# Patient Record
Sex: FEMALE | Race: WHITE
[De-identification: names, ages, dates, MRNs, and addresses within clinical notes are randomized per-mention and may not be internally consistent; named-entity substitution may affect disease eponyms.]

---

## 2017-03-07 ENCOUNTER — RX ONLY (RX ONLY)
Age: 52
End: 2017-03-07

## 2017-03-07 ENCOUNTER — DOCTOR'S OFFICE (OUTPATIENT)
Dept: URBAN - NONMETROPOLITAN AREA CLINIC 1 | Facility: CLINIC | Age: 52
Setting detail: OPHTHALMOLOGY
End: 2017-03-07
Payer: COMMERCIAL

## 2017-03-07 DIAGNOSIS — H44.30: ICD-10-CM

## 2017-03-07 DIAGNOSIS — H04.122: ICD-10-CM

## 2017-03-07 DIAGNOSIS — H52.13: ICD-10-CM

## 2017-03-07 DIAGNOSIS — H04.121: ICD-10-CM

## 2017-03-07 DIAGNOSIS — H43.813: ICD-10-CM

## 2017-03-07 PROCEDURE — 92134 CPTRZ OPH DX IMG PST SGM RTA: CPT | Performed by: OPHTHALMOLOGY

## 2017-03-07 PROCEDURE — 92014 COMPRE OPH EXAM EST PT 1/>: CPT | Performed by: OPHTHALMOLOGY

## 2017-03-07 PROCEDURE — 83861 MICROFLUID ANALY TEARS: CPT | Performed by: OPHTHALMOLOGY

## 2017-03-07 ASSESSMENT — REFRACTION_MANIFEST
OD_VA1: 20/
OS_VA1: 20/
OD_VA1: 20/
OD_VA1: 20/
OU_VA: 20/
OD_VA2: 20/
OD_VA3: 20/
OS_VA2: 20/
OD_VA3: 20/
OS_VA2: 20/
OS_VA3: 20/
OD_VA3: 20/
OD_VA2: 20/
OS_VA1: 20/
OS_VA3: 20/
OS_VA1: 20/
OU_VA: 20/
OD_VA2: 20/
OS_VA3: 20/
OU_VA: 20/
OS_VA2: 20/

## 2017-03-07 ASSESSMENT — REFRACTION_AUTOREFRACTION
OD_AXIS: 41
OD_SPHERE: -3.25
OD_AXIS: 011
OS_AXIS: 28
OS_SPHERE: -2.75
OD_CYLINDER: -0.75
OD_SPHERE: +0.75
OS_SPHERE: +0.25
OS_CYLINDER: -0.50
OS_CYLINDER: -0.75
OD_CYLINDER: -1.25
OS_AXIS: 017

## 2017-03-07 ASSESSMENT — SPHEQUIV_DERIVED
OS_SPHEQUIV: -0.125
OD_SPHEQUIV: 0.125
OD_SPHEQUIV: -3.625
OS_SPHEQUIV: -3

## 2017-03-07 ASSESSMENT — REFRACTION_CURRENTRX
OD_OVR_VA: 20/
OS_OVR_VA: 20/

## 2017-03-07 ASSESSMENT — CONFRONTATIONAL VISUAL FIELD TEST (CVF)
OS_FINDINGS: FULL
OD_FINDINGS: FULL

## 2017-03-07 ASSESSMENT — VISUAL ACUITY
OS_BCVA: 20/25+2
OD_BCVA: 20/30+2

## 2017-03-17 ENCOUNTER — DOCTOR'S OFFICE (OUTPATIENT)
Dept: URBAN - NONMETROPOLITAN AREA CLINIC 1 | Facility: CLINIC | Age: 52
Setting detail: OPHTHALMOLOGY
End: 2017-03-17
Payer: COMMERCIAL

## 2017-03-17 DIAGNOSIS — H04.122: ICD-10-CM

## 2017-03-17 DIAGNOSIS — H43.813: ICD-10-CM

## 2017-03-17 DIAGNOSIS — H44.22: ICD-10-CM

## 2017-03-17 DIAGNOSIS — H04.121: ICD-10-CM

## 2017-03-17 DIAGNOSIS — H44.21: ICD-10-CM

## 2017-03-17 PROCEDURE — 92014 COMPRE OPH EXAM EST PT 1/>: CPT | Performed by: OPHTHALMOLOGY

## 2017-03-17 PROCEDURE — 92226 OPHTHALMOSCOPY EXT SUBSEQUENT: CPT | Performed by: OPHTHALMOLOGY

## 2017-03-17 ASSESSMENT — REFRACTION_MANIFEST
OU_VA: 20/
OS_VA3: 20/
OS_VA2: 20/
OS_VA1: 20/
OD_VA3: 20/
OD_VA2: 20/
OS_VA1: 20/
OS_VA3: 20/
OS_VA2: 20/
OD_VA3: 20/
OS_VA2: 20/
OD_VA1: 20/
OU_VA: 20/
OS_VA1: 20/
OD_VA2: 20/
OD_VA1: 20/
OD_VA1: 20/
OU_VA: 20/
OD_VA2: 20/
OD_VA3: 20/
OS_VA3: 20/

## 2017-03-17 ASSESSMENT — REFRACTION_AUTOREFRACTION
OD_AXIS: 41
OS_CYLINDER: -0.75
OS_AXIS: 017
OS_SPHERE: +0.25
OD_AXIS: 011
OS_AXIS: 28
OD_CYLINDER: -0.75
OD_SPHERE: +0.75
OS_CYLINDER: -0.50
OD_SPHERE: -3.25
OS_SPHERE: -2.75
OD_CYLINDER: -1.25

## 2017-03-17 ASSESSMENT — REFRACTION_CURRENTRX
OS_OVR_VA: 20/
OD_OVR_VA: 20/
OS_OVR_VA: 20/
OS_OVR_VA: 20/

## 2017-03-17 ASSESSMENT — CONFRONTATIONAL VISUAL FIELD TEST (CVF)
OD_FINDINGS: FULL
OS_FINDINGS: FULL

## 2017-03-17 ASSESSMENT — SPHEQUIV_DERIVED
OS_SPHEQUIV: -3
OD_SPHEQUIV: 0.125
OS_SPHEQUIV: -0.125
OD_SPHEQUIV: -3.625

## 2017-03-17 ASSESSMENT — VISUAL ACUITY
OD_BCVA: 20/25
OS_BCVA: 20/25

## 2017-09-18 ENCOUNTER — DOCTOR'S OFFICE (OUTPATIENT)
Dept: URBAN - NONMETROPOLITAN AREA CLINIC 1 | Facility: CLINIC | Age: 52
Setting detail: OPHTHALMOLOGY
End: 2017-09-18
Payer: COMMERCIAL

## 2017-09-18 DIAGNOSIS — H04.123: ICD-10-CM

## 2017-09-18 DIAGNOSIS — H44.21: ICD-10-CM

## 2017-09-18 DIAGNOSIS — H43.813: ICD-10-CM

## 2017-09-18 DIAGNOSIS — H44.22: ICD-10-CM

## 2017-09-18 DIAGNOSIS — H44.23: ICD-10-CM

## 2017-09-18 PROCEDURE — 92014 COMPRE OPH EXAM EST PT 1/>: CPT | Performed by: OPHTHALMOLOGY

## 2017-09-18 PROCEDURE — 92226 OPHTHALMOSCOPY EXT SUBSEQUENT: CPT | Performed by: OPHTHALMOLOGY

## 2017-09-18 ASSESSMENT — REFRACTION_MANIFEST
OD_VA3: 20/
OS_VA3: 20/
OS_VA2: 20/
OD_VA2: 20/
OS_VA1: 20/
OS_VA3: 20/
OU_VA: 20/
OD_VA1: 20/
OU_VA: 20/
OD_VA1: 20/
OS_VA2: 20/
OD_VA2: 20/
OU_VA: 20/
OD_VA1: 20/
OS_VA3: 20/
OD_VA3: 20/
OS_VA2: 20/
OS_VA1: 20/
OD_VA3: 20/
OS_VA1: 20/
OD_VA2: 20/

## 2017-09-18 ASSESSMENT — VISUAL ACUITY
OD_BCVA: 20/25
OS_BCVA: 20/25

## 2017-09-18 ASSESSMENT — SPHEQUIV_DERIVED
OS_SPHEQUIV: -3
OD_SPHEQUIV: -3.625
OS_SPHEQUIV: -0.125
OD_SPHEQUIV: 0.125

## 2017-09-18 ASSESSMENT — REFRACTION_CURRENTRX
OS_OVR_VA: 20/
OS_OVR_VA: 20/
OD_OVR_VA: 20/
OD_OVR_VA: 20/
OS_OVR_VA: 20/
OD_OVR_VA: 20/

## 2017-09-18 ASSESSMENT — CONFRONTATIONAL VISUAL FIELD TEST (CVF)
OD_FINDINGS: FULL
OS_FINDINGS: FULL

## 2017-09-18 ASSESSMENT — REFRACTION_AUTOREFRACTION
OS_AXIS: 017
OS_CYLINDER: -0.75
OD_CYLINDER: -0.75
OS_SPHERE: +0.25
OD_SPHERE: -3.25
OD_AXIS: 41
OS_AXIS: 28
OD_SPHERE: +0.75
OD_AXIS: 011
OS_SPHERE: -2.75
OD_CYLINDER: -1.25
OS_CYLINDER: -0.50

## 2018-03-29 ENCOUNTER — DOCTOR'S OFFICE (OUTPATIENT)
Dept: URBAN - NONMETROPOLITAN AREA CLINIC 1 | Facility: CLINIC | Age: 53
Setting detail: OPHTHALMOLOGY
End: 2018-03-29
Payer: COMMERCIAL

## 2018-03-29 DIAGNOSIS — H44.23: ICD-10-CM

## 2018-03-29 DIAGNOSIS — H43.811: ICD-10-CM

## 2018-03-29 DIAGNOSIS — H43.812: ICD-10-CM

## 2018-03-29 DIAGNOSIS — H04.123: ICD-10-CM

## 2018-03-29 DIAGNOSIS — H43.813: ICD-10-CM

## 2018-03-29 DIAGNOSIS — H04.122: ICD-10-CM

## 2018-03-29 PROCEDURE — 92134 CPTRZ OPH DX IMG PST SGM RTA: CPT | Performed by: OPHTHALMOLOGY

## 2018-03-29 PROCEDURE — 92226 OPHTHALMOSCOPY EXT SUBSEQUENT: CPT | Performed by: OPHTHALMOLOGY

## 2018-03-29 PROCEDURE — 83861 MICROFLUID ANALY TEARS: CPT | Performed by: OPHTHALMOLOGY

## 2018-03-29 PROCEDURE — 92014 COMPRE OPH EXAM EST PT 1/>: CPT | Performed by: OPHTHALMOLOGY

## 2018-03-29 ASSESSMENT — REFRACTION_MANIFEST
OS_VA1: 20/
OD_VA3: 20/
OU_VA: 20/
OS_VA2: 20/
OS_VA3: 20/
OS_VA2: 20/
OD_VA2: 20/
OU_VA: 20/
OD_VA3: 20/
OD_VA2: 20/
OD_VA3: 20/
OD_VA1: 20/
OS_VA2: 20/
OS_VA3: 20/
OS_VA1: 20/
OD_VA1: 20/
OU_VA: 20/
OD_VA2: 20/
OD_VA1: 20/
OS_VA1: 20/
OS_VA3: 20/

## 2018-03-29 ASSESSMENT — REFRACTION_AUTOREFRACTION
OS_AXIS: 017
OS_CYLINDER: -0.50
OD_AXIS: 41
OS_SPHERE: +0.25
OD_SPHERE: -3.25
OS_AXIS: 28
OD_CYLINDER: -0.75
OS_CYLINDER: -0.75
OD_SPHERE: +0.75
OD_AXIS: 011
OS_SPHERE: -2.75
OD_CYLINDER: -1.25

## 2018-03-29 ASSESSMENT — CONFRONTATIONAL VISUAL FIELD TEST (CVF)
OS_FINDINGS: FULL
OD_FINDINGS: FULL

## 2018-03-29 ASSESSMENT — REFRACTION_CURRENTRX
OD_OVR_VA: 20/
OS_OVR_VA: 20/
OD_OVR_VA: 20/
OS_OVR_VA: 20/
OD_OVR_VA: 20/
OS_OVR_VA: 20/

## 2018-03-29 ASSESSMENT — SPHEQUIV_DERIVED
OD_SPHEQUIV: -3.625
OD_SPHEQUIV: 0.125
OS_SPHEQUIV: -3
OS_SPHEQUIV: -0.125

## 2018-03-29 ASSESSMENT — VISUAL ACUITY
OS_BCVA: 20/25-2
OD_BCVA: 20/25

## 2018-04-27 ENCOUNTER — DOCTOR'S OFFICE (OUTPATIENT)
Dept: URBAN - NONMETROPOLITAN AREA CLINIC 1 | Facility: CLINIC | Age: 53
Setting detail: OPHTHALMOLOGY
End: 2018-04-27
Payer: COMMERCIAL

## 2018-04-27 DIAGNOSIS — H44.23: ICD-10-CM

## 2018-04-27 DIAGNOSIS — H04.123: ICD-10-CM

## 2018-04-27 DIAGNOSIS — H10.13: ICD-10-CM

## 2018-04-27 PROCEDURE — 92012 INTRM OPH EXAM EST PATIENT: CPT | Performed by: OPHTHALMOLOGY

## 2018-04-27 ASSESSMENT — KERATOMETRY
OD_AXISANGLE_DEGREES: 94
OS_AXISANGLE_DEGREES: 99
OS_K2POWER_DIOPTERS: 45.37
OD_K1POWER_DIOPTERS: 44.0
OD_K2POWER_DIOPTERS: 45.62
OS_K1POWER_DIOPTERS: 44.62

## 2018-04-27 ASSESSMENT — REFRACTION_MANIFEST
OD_VA3: 20/
OS_VA1: 20/
OD_VA3: 20/
OS_VA3: 20/
OU_VA: 20/
OS_VA3: 20/
OS_VA2: 20/
OS_VA2: 20/
OU_VA: 20/
OD_VA2: 20/
OU_VA: 20/
OS_VA2: 20/
OS_VA1: 20/
OD_VA2: 20/
OD_VA3: 20/
OD_VA1: 20/
OD_VA2: 20/
OS_VA1: 20/
OS_VA3: 20/
OD_VA1: 20/
OD_VA1: 20/

## 2018-04-27 ASSESSMENT — REFRACTION_AUTOREFRACTION
OS_CYLINDER: -0.50
OD_SPHERE: +1.00
OD_CYLINDER: -0.75
OD_CYLINDER: -1.00
OS_AXIS: 017
OD_SPHERE: -3.25
OS_SPHERE: -2.75
OS_CYLINDER: -0.50
OD_AXIS: 011
OD_AXIS: 023
OS_SPHERE: PL
OS_AXIS: 178

## 2018-04-27 ASSESSMENT — VISUAL ACUITY
OS_BCVA: 20/25-1
OD_BCVA: 20/25

## 2018-04-27 ASSESSMENT — REFRACTION_CURRENTRX
OD_OVR_VA: 20/
OS_OVR_VA: 20/

## 2018-04-27 ASSESSMENT — SPHEQUIV_DERIVED
OD_SPHEQUIV: 0.5
OS_SPHEQUIV: -3
OD_SPHEQUIV: -3.625

## 2018-04-27 ASSESSMENT — AXIALLENGTH_DERIVED
OD_AL: 24.5613
OS_AL: 24.2283
OD_AL: 22.9348

## 2018-05-30 ENCOUNTER — RX ONLY (RX ONLY)
Age: 53
End: 2018-05-30

## 2018-05-30 ENCOUNTER — DOCTOR'S OFFICE (OUTPATIENT)
Dept: URBAN - NONMETROPOLITAN AREA CLINIC 1 | Facility: CLINIC | Age: 53
Setting detail: OPHTHALMOLOGY
End: 2018-05-30
Payer: COMMERCIAL

## 2018-05-30 DIAGNOSIS — H04.121: ICD-10-CM

## 2018-05-30 DIAGNOSIS — H10.13: ICD-10-CM

## 2018-05-30 DIAGNOSIS — H04.122: ICD-10-CM

## 2018-05-30 PROCEDURE — 99212 OFFICE O/P EST SF 10 MIN: CPT | Performed by: OPHTHALMOLOGY

## 2018-05-30 ASSESSMENT — LID EXAM ASSESSMENTS
OD_BLEPHARITIS: ABSENT
OS_BLEPHARITIS: ABSENT

## 2018-05-30 ASSESSMENT — SUPERFICIAL PUNCTATE KERATITIS (SPK)
OD_SPK: T
OS_SPK: T

## 2018-05-30 ASSESSMENT — DRY EYES - PHYSICIAN NOTES: OD_GENERALCOMMENTS: INFERIORLY

## 2018-05-31 ASSESSMENT — VISUAL ACUITY
OD_BCVA: 20/30+1
OS_BCVA: 20/30+1

## 2018-05-31 ASSESSMENT — REFRACTION_MANIFEST
OU_VA: 20/
OS_VA3: 20/
OD_VA3: 20/
OD_VA1: 20/
OS_VA1: 20/
OU_VA: 20/
OS_VA1: 20/
OS_VA2: 20/
OS_VA2: 20/
OD_VA1: 20/
OD_VA2: 20/
OS_VA2: 20/
OD_VA2: 20/
OS_VA3: 20/
OD_VA3: 20/
OS_VA3: 20/
OD_VA1: 20/
OS_VA1: 20/
OD_VA2: 20/
OD_VA3: 20/
OU_VA: 20/

## 2018-05-31 ASSESSMENT — REFRACTION_AUTOREFRACTION
OS_CYLINDER: -0.50
OD_CYLINDER: -1.00
OD_AXIS: 023
OS_SPHERE: PL
OS_AXIS: 017
OS_AXIS: 178
OS_SPHERE: -2.75
OS_CYLINDER: -0.50
OD_SPHERE: -3.25
OD_SPHERE: +1.00
OD_AXIS: 011
OD_CYLINDER: -0.75

## 2018-05-31 ASSESSMENT — REFRACTION_CURRENTRX
OS_OVR_VA: 20/
OD_OVR_VA: 20/
OS_OVR_VA: 20/
OD_OVR_VA: 20/
OS_OVR_VA: 20/
OD_OVR_VA: 20/

## 2018-05-31 ASSESSMENT — SPHEQUIV_DERIVED
OD_SPHEQUIV: -3.625
OD_SPHEQUIV: 0.5
OS_SPHEQUIV: -3

## 2018-08-29 ENCOUNTER — DOCTOR'S OFFICE (OUTPATIENT)
Dept: URBAN - NONMETROPOLITAN AREA CLINIC 1 | Facility: CLINIC | Age: 53
Setting detail: OPHTHALMOLOGY
End: 2018-08-29
Payer: COMMERCIAL

## 2018-08-29 DIAGNOSIS — H04.123: ICD-10-CM

## 2018-08-29 DIAGNOSIS — H04.121: ICD-10-CM

## 2018-08-29 DIAGNOSIS — H04.122: ICD-10-CM

## 2018-08-29 DIAGNOSIS — H10.13: ICD-10-CM

## 2018-08-29 PROCEDURE — 83861 MICROFLUID ANALY TEARS: CPT | Performed by: OPHTHALMOLOGY

## 2018-08-29 PROCEDURE — 68761 CLOSE TEAR DUCT OPENING: CPT | Performed by: OPHTHALMOLOGY

## 2018-08-29 PROCEDURE — 99213 OFFICE O/P EST LOW 20 MIN: CPT | Performed by: OPHTHALMOLOGY

## 2018-08-29 ASSESSMENT — PUNCTA - ASSESSMENT
OS_PUNCTA: COL PLUG LLL LUL MED
OD_PUNCTA: COL PLUG RLL RUL MED

## 2018-08-29 ASSESSMENT — LID EXAM ASSESSMENTS
OD_BLEPHARITIS: ABSENT
OS_BLEPHARITIS: ABSENT

## 2018-08-29 ASSESSMENT — SUPERFICIAL PUNCTATE KERATITIS (SPK)
OS_SPK: T
OD_SPK: T 1+

## 2018-08-29 ASSESSMENT — DRY EYES - PHYSICIAN NOTES
OS_GENERALCOMMENTS: DIFFUSE
OD_GENERALCOMMENTS: DIFFUSE

## 2018-08-30 ASSESSMENT — REFRACTION_CURRENTRX
OS_OVR_VA: 20/
OD_OVR_VA: 20/
OS_OVR_VA: 20/
OD_OVR_VA: 20/
OS_OVR_VA: 20/
OD_OVR_VA: 20/

## 2018-08-30 ASSESSMENT — REFRACTION_MANIFEST
OD_VA2: 20/
OU_VA: 20/
OD_VA3: 20/
OD_VA3: 20/
OU_VA: 20/
OS_VA2: 20/
OS_VA1: 20/
OS_VA2: 20/
OS_VA3: 20/
OD_VA1: 20/
OD_VA2: 20/
OS_VA1: 20/
OD_VA1: 20/
OS_VA3: 20/

## 2018-08-30 ASSESSMENT — REFRACTION_AUTOREFRACTION
OD_CYLINDER: -1.00
OS_CYLINDER: -0.50
OD_SPHERE: -3.25
OS_CYLINDER: -0.50
OD_SPHERE: +1.00
OS_SPHERE: PL
OS_AXIS: 017
OD_AXIS: 023
OD_CYLINDER: -0.75
OS_SPHERE: -2.75
OS_AXIS: 178
OD_AXIS: 011

## 2018-08-30 ASSESSMENT — SPHEQUIV_DERIVED
OD_SPHEQUIV: -3.625
OD_SPHEQUIV: 0.5
OS_SPHEQUIV: -3

## 2018-08-30 ASSESSMENT — VISUAL ACUITY
OD_BCVA: 20/30
OS_BCVA: 20/30

## 2018-09-27 ENCOUNTER — DOCTOR'S OFFICE (OUTPATIENT)
Dept: URBAN - NONMETROPOLITAN AREA CLINIC 1 | Facility: CLINIC | Age: 53
Setting detail: OPHTHALMOLOGY
End: 2018-09-27
Payer: COMMERCIAL

## 2018-09-27 DIAGNOSIS — H44.23: ICD-10-CM

## 2018-09-27 DIAGNOSIS — H04.123: ICD-10-CM

## 2018-09-27 DIAGNOSIS — H43.812: ICD-10-CM

## 2018-09-27 DIAGNOSIS — H10.13: ICD-10-CM

## 2018-09-27 DIAGNOSIS — H43.813: ICD-10-CM

## 2018-09-27 DIAGNOSIS — H43.811: ICD-10-CM

## 2018-09-27 PROCEDURE — 92014 COMPRE OPH EXAM EST PT 1/>: CPT | Performed by: OPHTHALMOLOGY

## 2018-09-27 PROCEDURE — 92226 OPHTHALMOSCOPY EXT SUBSEQUENT: CPT | Performed by: OPHTHALMOLOGY

## 2018-09-27 PROCEDURE — 92134 CPTRZ OPH DX IMG PST SGM RTA: CPT | Performed by: OPHTHALMOLOGY

## 2018-09-27 ASSESSMENT — REFRACTION_CURRENTRX
OD_OVR_VA: 20/
OS_OVR_VA: 20/
OD_OVR_VA: 20/
OD_OVR_VA: 20/
OS_OVR_VA: 20/
OS_OVR_VA: 20/

## 2018-09-27 ASSESSMENT — REFRACTION_MANIFEST
OD_VA2: 20/
OD_VA1: 20/
OS_VA1: 20/
OD_VA3: 20/
OS_VA3: 20/
OD_VA2: 20/
OU_VA: 20/
OD_VA1: 20/
OS_VA2: 20/
OS_VA3: 20/
OD_VA3: 20/
OS_VA1: 20/
OS_VA2: 20/
OU_VA: 20/

## 2018-09-27 ASSESSMENT — PUNCTA - ASSESSMENT
OS_PUNCTA: COL PLUG LLL LUL MED
OD_PUNCTA: COL PLUG RLL RUL MED

## 2018-09-27 ASSESSMENT — SPHEQUIV_DERIVED
OS_SPHEQUIV: -3
OD_SPHEQUIV: -3.625
OD_SPHEQUIV: 0.5

## 2018-09-27 ASSESSMENT — REFRACTION_AUTOREFRACTION
OD_CYLINDER: -0.75
OS_CYLINDER: -0.50
OD_SPHERE: +1.00
OD_AXIS: 023
OS_SPHERE: -2.75
OD_SPHERE: -3.25
OD_AXIS: 011
OS_SPHERE: PL
OS_AXIS: 017
OS_CYLINDER: -0.50
OD_CYLINDER: -1.00
OS_AXIS: 178

## 2018-09-27 ASSESSMENT — LID EXAM ASSESSMENTS
OD_BLEPHARITIS: ABSENT
OS_BLEPHARITIS: ABSENT

## 2018-09-27 ASSESSMENT — DRY EYES - PHYSICIAN NOTES
OD_GENERALCOMMENTS: DIFFUSE
OS_GENERALCOMMENTS: DIFFUSE

## 2018-09-27 ASSESSMENT — SUPERFICIAL PUNCTATE KERATITIS (SPK)
OD_SPK: T 1+
OS_SPK: T

## 2018-09-27 ASSESSMENT — VISUAL ACUITY
OS_BCVA: 20/25+1
OD_BCVA: 20/25-1

## 2018-09-27 ASSESSMENT — CONFRONTATIONAL VISUAL FIELD TEST (CVF)
OS_FINDINGS: FULL
OD_FINDINGS: FULL

## 2018-10-08 ENCOUNTER — RX ONLY (RX ONLY)
Age: 53
End: 2018-10-08

## 2018-10-08 ENCOUNTER — DOCTOR'S OFFICE (OUTPATIENT)
Dept: URBAN - NONMETROPOLITAN AREA CLINIC 1 | Facility: CLINIC | Age: 53
Setting detail: OPHTHALMOLOGY
End: 2018-10-08
Payer: COMMERCIAL

## 2018-10-08 DIAGNOSIS — H04.122: ICD-10-CM

## 2018-10-08 DIAGNOSIS — H04.121: ICD-10-CM

## 2018-10-08 PROCEDURE — 99213 OFFICE O/P EST LOW 20 MIN: CPT | Performed by: OPHTHALMOLOGY

## 2018-10-08 ASSESSMENT — LID EXAM ASSESSMENTS
OS_BLEPHARITIS: ABSENT
OD_BLEPHARITIS: ABSENT

## 2018-10-08 ASSESSMENT — DRY EYES - PHYSICIAN NOTES
OS_GENERALCOMMENTS: INFERIOR
OD_GENERALCOMMENTS: INFERIOR

## 2018-10-08 ASSESSMENT — SUPERFICIAL PUNCTATE KERATITIS (SPK)
OD_SPK: T
OS_SPK: T

## 2018-10-09 ASSESSMENT — REFRACTION_AUTOREFRACTION
OS_CYLINDER: -0.50
OD_SPHERE: -3.25
OD_AXIS: 011
OS_CYLINDER: -0.50
OS_AXIS: 017
OD_SPHERE: +1.00
OS_AXIS: 178
OD_AXIS: 023
OS_SPHERE: PL
OD_CYLINDER: -0.75
OD_CYLINDER: -1.00
OS_SPHERE: -2.75

## 2018-10-09 ASSESSMENT — REFRACTION_CURRENTRX
OS_OVR_VA: 20/
OD_OVR_VA: 20/

## 2018-10-09 ASSESSMENT — REFRACTION_MANIFEST
OD_VA2: 20/
OS_VA3: 20/
OD_VA1: 20/
OD_VA3: 20/
OS_VA3: 20/
OD_VA2: 20/
OU_VA: 20/
OD_VA3: 20/
OS_VA1: 20/
OS_VA2: 20/
OD_VA1: 20/
OS_VA1: 20/
OS_VA2: 20/
OU_VA: 20/

## 2018-10-09 ASSESSMENT — VISUAL ACUITY
OD_BCVA: 20/25+2
OS_BCVA: 20/25+2

## 2018-10-09 ASSESSMENT — SPHEQUIV_DERIVED
OD_SPHEQUIV: 0.5
OD_SPHEQUIV: -3.625
OS_SPHEQUIV: -3

## 2018-12-27 ENCOUNTER — DOCTOR'S OFFICE (OUTPATIENT)
Dept: URBAN - NONMETROPOLITAN AREA CLINIC 1 | Facility: CLINIC | Age: 53
Setting detail: OPHTHALMOLOGY
End: 2018-12-27
Payer: COMMERCIAL

## 2018-12-27 DIAGNOSIS — H04.123: ICD-10-CM

## 2018-12-27 PROCEDURE — 99214 OFFICE O/P EST MOD 30 MIN: CPT | Performed by: OPHTHALMOLOGY

## 2018-12-27 ASSESSMENT — LID EXAM ASSESSMENTS
OS_BLEPHARITIS: ABSENT
OD_BLEPHARITIS: ABSENT

## 2018-12-27 ASSESSMENT — SUPERFICIAL PUNCTATE KERATITIS (SPK)
OD_SPK: T
OS_SPK: T

## 2018-12-28 ASSESSMENT — REFRACTION_MANIFEST
OS_VA3: 20/
OU_VA: 20/
OU_VA: 20/
OD_VA2: 20/
OD_VA1: 20/
OS_VA2: 20/
OD_VA3: 20/
OS_VA1: 20/
OS_VA3: 20/
OS_VA2: 20/
OD_VA2: 20/
OD_VA1: 20/
OD_VA3: 20/
OS_VA1: 20/

## 2018-12-28 ASSESSMENT — VISUAL ACUITY
OS_BCVA: 20/25+2
OD_BCVA: 20/25+2

## 2018-12-28 ASSESSMENT — REFRACTION_CURRENTRX
OD_OVR_VA: 20/
OD_OVR_VA: 20/
OS_OVR_VA: 20/
OD_OVR_VA: 20/

## 2018-12-28 ASSESSMENT — REFRACTION_AUTOREFRACTION
OD_SPHERE: +1.00
OS_AXIS: 178
OD_SPHERE: -3.25
OS_AXIS: 017
OD_AXIS: 023
OD_AXIS: 011
OS_CYLINDER: -0.50
OD_CYLINDER: -0.75
OS_SPHERE: -2.75
OD_CYLINDER: -1.00
OS_SPHERE: PL
OS_CYLINDER: -0.50

## 2018-12-28 ASSESSMENT — SPHEQUIV_DERIVED
OD_SPHEQUIV: 0.5
OS_SPHEQUIV: -3
OD_SPHEQUIV: -3.625

## 2019-04-30 ENCOUNTER — DOCTOR'S OFFICE (OUTPATIENT)
Dept: URBAN - NONMETROPOLITAN AREA CLINIC 1 | Facility: CLINIC | Age: 54
Setting detail: OPHTHALMOLOGY
End: 2019-04-30
Payer: COMMERCIAL

## 2019-04-30 DIAGNOSIS — H10.13: ICD-10-CM

## 2019-04-30 DIAGNOSIS — H40.013: ICD-10-CM

## 2019-04-30 DIAGNOSIS — H04.123: ICD-10-CM

## 2019-04-30 DIAGNOSIS — H43.813: ICD-10-CM

## 2019-04-30 PROCEDURE — 92014 COMPRE OPH EXAM EST PT 1/>: CPT | Performed by: OPHTHALMOLOGY

## 2019-04-30 PROCEDURE — 76514 ECHO EXAM OF EYE THICKNESS: CPT | Performed by: OPHTHALMOLOGY

## 2019-04-30 PROCEDURE — 92250 FUNDUS PHOTOGRAPHY W/I&R: CPT | Performed by: OPHTHALMOLOGY

## 2019-04-30 ASSESSMENT — REFRACTION_MANIFEST
OS_VA2: 20/
OD_VA3: 20/
OS_VA1: 20/
OD_VA2: 20/
OS_VA3: 20/
OD_VA3: 20/
OS_VA3: 20/
OU_VA: 20/
OS_VA2: 20/
OD_VA1: 20/
OD_VA2: 20/
OS_VA1: 20/
OU_VA: 20/
OD_VA1: 20/

## 2019-04-30 ASSESSMENT — REFRACTION_AUTOREFRACTION
OD_CYLINDER: -0.50
OS_SPHERE: -2.75
OS_CYLINDER: -0.50
OD_SPHERE: -3.25
OS_CYLINDER: -1.75
OS_AXIS: 017
OS_AXIS: 049
OD_CYLINDER: -0.75
OD_AXIS: 151
OD_AXIS: 011
OD_SPHERE: +0.75
OS_SPHERE: 0.00

## 2019-04-30 ASSESSMENT — LID EXAM ASSESSMENTS
OS_BLEPHARITIS: ABSENT
OD_BLEPHARITIS: ABSENT

## 2019-04-30 ASSESSMENT — SUPERFICIAL PUNCTATE KERATITIS (SPK)
OS_SPK: T
OD_SPK: T

## 2019-04-30 ASSESSMENT — SPHEQUIV_DERIVED
OS_SPHEQUIV: -3
OD_SPHEQUIV: 0.5
OD_SPHEQUIV: -3.625
OS_SPHEQUIV: -0.875

## 2019-04-30 ASSESSMENT — KERATOMETRY
OS_K1POWER_DIOPTERS: 44.62
OD_AXISANGLE_DEGREES: 94
OD_K2POWER_DIOPTERS: 45.62
OS_AXISANGLE_DEGREES: 99
OD_K1POWER_DIOPTERS: 44.0
OS_K2POWER_DIOPTERS: 45.37

## 2019-04-30 ASSESSMENT — REFRACTION_CURRENTRX
OD_OVR_VA: 20/
OS_OVR_VA: 20/
OD_OVR_VA: 20/
OS_OVR_VA: 20/
OS_OVR_VA: 20/
OD_OVR_VA: 20/

## 2019-04-30 ASSESSMENT — VISUAL ACUITY
OD_BCVA: 20/25-2
OS_BCVA: 20/30+2

## 2019-04-30 ASSESSMENT — AXIALLENGTH_DERIVED
OS_AL: 24.2283
OD_AL: 24.5613
OS_AL: 23.3855
OD_AL: 22.9348

## 2019-04-30 ASSESSMENT — CONFRONTATIONAL VISUAL FIELD TEST (CVF)
OS_FINDINGS: FULL
OD_FINDINGS: FULL

## 2019-06-26 ENCOUNTER — DOCTOR'S OFFICE (OUTPATIENT)
Dept: URBAN - NONMETROPOLITAN AREA CLINIC 1 | Facility: CLINIC | Age: 54
Setting detail: OPHTHALMOLOGY
End: 2019-06-26
Payer: COMMERCIAL

## 2019-06-26 DIAGNOSIS — H04.122: ICD-10-CM

## 2019-06-26 DIAGNOSIS — H04.123: ICD-10-CM

## 2019-06-26 DIAGNOSIS — H04.121: ICD-10-CM

## 2019-06-26 PROCEDURE — 99214 OFFICE O/P EST MOD 30 MIN: CPT | Performed by: OPHTHALMOLOGY

## 2019-06-26 PROCEDURE — 68761 CLOSE TEAR DUCT OPENING: CPT | Performed by: OPHTHALMOLOGY

## 2019-06-26 ASSESSMENT — DRY EYES - PHYSICIAN NOTES
OS_GENERALCOMMENTS: DIFFUSE
OD_GENERALCOMMENTS: DIFFUSE

## 2019-06-26 ASSESSMENT — LID EXAM ASSESSMENTS
OS_BLEPHARITIS: ABSENT
OD_BLEPHARITIS: ABSENT

## 2019-06-26 ASSESSMENT — SUPERFICIAL PUNCTATE KERATITIS (SPK)
OD_SPK: T 1+
OS_SPK: T 1+

## 2019-06-26 ASSESSMENT — DECREASING TEAR LAKE - SEVERITY SCORE
OD_DEC_TEARLAKE: T 1+
OS_DEC_TEARLAKE: T 1+

## 2019-06-27 ASSESSMENT — REFRACTION_AUTOREFRACTION
OD_CYLINDER: -0.75
OS_SPHERE: -2.75
OD_AXIS: 151
OD_SPHERE: -3.25
OD_SPHERE: +0.75
OS_AXIS: 017
OS_CYLINDER: -1.75
OD_CYLINDER: -0.50
OS_SPHERE: 0.00
OS_AXIS: 049
OS_CYLINDER: -0.50
OD_AXIS: 011

## 2019-06-27 ASSESSMENT — SPHEQUIV_DERIVED
OS_SPHEQUIV: -0.875
OS_SPHEQUIV: -3
OD_SPHEQUIV: 0.5
OD_SPHEQUIV: -3.625

## 2019-06-27 ASSESSMENT — REFRACTION_CURRENTRX
OS_OVR_VA: 20/
OD_OVR_VA: 20/

## 2019-06-27 ASSESSMENT — REFRACTION_MANIFEST
OS_VA3: 20/
OS_VA3: 20/
OD_VA1: 20/
OU_VA: 20/
OD_VA2: 20/
OS_VA2: 20/
OD_VA1: 20/
OS_VA1: 20/
OS_VA2: 20/
OD_VA3: 20/
OD_VA2: 20/
OS_VA1: 20/
OD_VA3: 20/
OU_VA: 20/

## 2019-06-27 ASSESSMENT — VISUAL ACUITY
OD_BCVA: 20/20-2
OS_BCVA: 20/20-2

## 2019-09-25 ENCOUNTER — DOCTOR'S OFFICE (OUTPATIENT)
Dept: URBAN - NONMETROPOLITAN AREA CLINIC 1 | Facility: CLINIC | Age: 54
Setting detail: OPHTHALMOLOGY
End: 2019-09-25
Payer: COMMERCIAL

## 2019-09-25 DIAGNOSIS — H04.123: ICD-10-CM

## 2019-09-25 PROCEDURE — 92012 INTRM OPH EXAM EST PATIENT: CPT | Performed by: OPHTHALMOLOGY

## 2019-09-25 ASSESSMENT — LID EXAM ASSESSMENTS
OD_BLEPHARITIS: ABSENT
OS_BLEPHARITIS: ABSENT

## 2019-09-25 ASSESSMENT — CONFRONTATIONAL VISUAL FIELD TEST (CVF)
OS_FINDINGS: FULL
OD_FINDINGS: FULL

## 2019-09-25 ASSESSMENT — LACRIMAL DUCT - ASSESSMENT
OS_LACRIMAL_DUCT: PLUG PRESENT
OD_LACRIMAL_DUCT: PLUG PRESENT

## 2019-09-26 ASSESSMENT — KERATOMETRY
OD_K2POWER_DIOPTERS: 45.62
OD_K1POWER_DIOPTERS: 44.0
OS_K2POWER_DIOPTERS: 45.37
OS_AXISANGLE_DEGREES: 99
OS_K1POWER_DIOPTERS: 44.62
OD_AXISANGLE_DEGREES: 94

## 2019-09-26 ASSESSMENT — VISUAL ACUITY
OS_BCVA: 20/25
OD_BCVA: 20/20

## 2019-09-26 ASSESSMENT — REFRACTION_MANIFEST
OU_VA: 20/
OD_VA3: 20/
OD_VA2: 20/
OD_VA2: 20/
OD_VA1: 20/
OS_VA1: 20/
OD_VA3: 20/
OS_VA2: 20/
OS_VA1: 20/
OD_VA1: 20/
OU_VA: 20/
OS_VA3: 20/
OS_VA2: 20/
OS_VA3: 20/

## 2019-09-26 ASSESSMENT — AXIALLENGTH_DERIVED
OD_AL: 22.71
OS_AL: 23.1487
OD_AL: 24.5613
OS_AL: 24.2283

## 2019-09-26 ASSESSMENT — REFRACTION_CURRENTRX
OD_OVR_VA: 20/
OS_OVR_VA: 20/
OD_OVR_VA: 20/
OS_OVR_VA: 20/
OS_OVR_VA: 20/
OD_OVR_VA: 20/

## 2019-09-26 ASSESSMENT — SPHEQUIV_DERIVED
OD_SPHEQUIV: -3.625
OD_SPHEQUIV: 1.125
OS_SPHEQUIV: -3
OS_SPHEQUIV: -0.25

## 2019-09-26 ASSESSMENT — REFRACTION_AUTOREFRACTION
OD_AXIS: 011
OD_CYLINDER: -0.75
OS_SPHERE: -2.75
OD_SPHERE: +1.50
OS_AXIS: 040
OD_SPHERE: -3.25
OS_CYLINDER: -0.50
OS_SPHERE: +0.25
OS_CYLINDER: -1.00
OD_AXIS: 082
OS_AXIS: 017
OD_CYLINDER: -0.75

## 2019-10-10 ENCOUNTER — DOCTOR'S OFFICE (OUTPATIENT)
Dept: URBAN - NONMETROPOLITAN AREA CLINIC 1 | Facility: CLINIC | Age: 54
Setting detail: OPHTHALMOLOGY
End: 2019-10-10
Payer: COMMERCIAL

## 2019-10-10 DIAGNOSIS — H44.23: ICD-10-CM

## 2019-10-10 DIAGNOSIS — H40.013: ICD-10-CM

## 2019-10-10 DIAGNOSIS — H43.812: ICD-10-CM

## 2019-10-10 DIAGNOSIS — H43.811: ICD-10-CM

## 2019-10-10 DIAGNOSIS — H43.813: ICD-10-CM

## 2019-10-10 DIAGNOSIS — H04.123: ICD-10-CM

## 2019-10-10 PROCEDURE — 83861 MICROFLUID ANALY TEARS: CPT | Performed by: OPHTHALMOLOGY

## 2019-10-10 PROCEDURE — 92014 COMPRE OPH EXAM EST PT 1/>: CPT | Performed by: OPHTHALMOLOGY

## 2019-10-10 PROCEDURE — 92134 CPTRZ OPH DX IMG PST SGM RTA: CPT | Performed by: OPHTHALMOLOGY

## 2019-10-10 PROCEDURE — 92226 OPHTHALMOSCOPY EXT SUBSEQUENT: CPT | Performed by: OPHTHALMOLOGY

## 2019-10-10 ASSESSMENT — LID EXAM ASSESSMENTS
OS_BLEPHARITIS: ABSENT
OD_BLEPHARITIS: ABSENT

## 2019-10-10 ASSESSMENT — SPHEQUIV_DERIVED
OD_SPHEQUIV: 1.125
OD_SPHEQUIV: -3.625
OS_SPHEQUIV: -0.25
OS_SPHEQUIV: -3

## 2019-10-10 ASSESSMENT — REFRACTION_AUTOREFRACTION
OD_AXIS: 011
OS_CYLINDER: -0.50
OD_SPHERE: -3.25
OS_AXIS: 040
OD_SPHERE: +1.50
OD_CYLINDER: -0.75
OD_AXIS: 082
OS_AXIS: 017
OS_CYLINDER: -1.00
OS_SPHERE: +0.25
OD_CYLINDER: -0.75
OS_SPHERE: -2.75

## 2019-10-10 ASSESSMENT — LACRIMAL DUCT - ASSESSMENT
OS_LACRIMAL_DUCT: PLUG PRESENT
OD_LACRIMAL_DUCT: PLUG PRESENT

## 2019-10-10 ASSESSMENT — REFRACTION_MANIFEST
OD_VA2: 20/
OD_VA1: 20/
OS_VA3: 20/
OD_VA3: 20/
OD_VA2: 20/
OD_VA3: 20/
OS_VA3: 20/
OS_VA1: 20/
OD_VA1: 20/
OU_VA: 20/
OS_VA2: 20/
OS_VA1: 20/
OU_VA: 20/
OS_VA2: 20/

## 2019-10-10 ASSESSMENT — REFRACTION_CURRENTRX
OD_OVR_VA: 20/
OS_OVR_VA: 20/

## 2019-10-10 ASSESSMENT — CONFRONTATIONAL VISUAL FIELD TEST (CVF)
OD_FINDINGS: FULL
OS_FINDINGS: FULL

## 2019-10-10 ASSESSMENT — VISUAL ACUITY
OD_BCVA: 20/25-1
OS_BCVA: 20/25-2

## 2020-06-02 ENCOUNTER — DOCTOR'S OFFICE (OUTPATIENT)
Dept: URBAN - NONMETROPOLITAN AREA CLINIC 1 | Facility: CLINIC | Age: 55
Setting detail: OPHTHALMOLOGY
End: 2020-06-02
Payer: COMMERCIAL

## 2020-06-02 DIAGNOSIS — H44.23: ICD-10-CM

## 2020-06-02 DIAGNOSIS — H04.123: ICD-10-CM

## 2020-06-02 DIAGNOSIS — H40.033: ICD-10-CM

## 2020-06-02 DIAGNOSIS — H43.813: ICD-10-CM

## 2020-06-02 DIAGNOSIS — H40.013: ICD-10-CM

## 2020-06-02 PROCEDURE — 92014 COMPRE OPH EXAM EST PT 1/>: CPT | Performed by: OPHTHALMOLOGY

## 2020-06-02 PROCEDURE — 92083 EXTENDED VISUAL FIELD XM: CPT | Performed by: OPHTHALMOLOGY

## 2020-06-02 PROCEDURE — 92132 CPTRZD OPH DX IMG ANT SGM: CPT | Performed by: OPHTHALMOLOGY

## 2020-06-02 ASSESSMENT — KERATOMETRY
OS_K1POWER_DIOPTERS: 44.62
OS_AXISANGLE_DEGREES: 99
OD_K1POWER_DIOPTERS: 44.0
OD_K2POWER_DIOPTERS: 45.62
OS_K2POWER_DIOPTERS: 45.37
OD_AXISANGLE_DEGREES: 94

## 2020-06-02 ASSESSMENT — SPHEQUIV_DERIVED
OS_SPHEQUIV: -3
OD_SPHEQUIV: -3.625
OD_SPHEQUIV: 0.125
OS_SPHEQUIV: -0.375

## 2020-06-02 ASSESSMENT — REFRACTION_AUTOREFRACTION
OD_CYLINDER: -0.75
OD_AXIS: 011
OD_SPHERE: +0.75
OD_SPHERE: -3.25
OS_CYLINDER: -1.25
OS_CYLINDER: -0.50
OD_CYLINDER: -1.25
OS_AXIS: 017
OD_AXIS: 040
OS_SPHERE: -2.75
OS_AXIS: 035
OS_SPHERE: +0.25

## 2020-06-02 ASSESSMENT — CONFRONTATIONAL VISUAL FIELD TEST (CVF)
OD_FINDINGS: FULL
OS_FINDINGS: FULL

## 2020-06-02 ASSESSMENT — AXIALLENGTH_DERIVED
OS_AL: 23.1956
OD_AL: 24.5613
OS_AL: 24.2283
OD_AL: 23.0737

## 2020-06-02 ASSESSMENT — VISUAL ACUITY
OD_BCVA: 20/25-2
OS_BCVA: 20/30+1

## 2020-06-02 ASSESSMENT — LACRIMAL DUCT - ASSESSMENT
OS_LACRIMAL_DUCT: PLUG PRESENT
OD_LACRIMAL_DUCT: PLUG PRESENT

## 2020-06-02 ASSESSMENT — LID EXAM ASSESSMENTS
OS_BLEPHARITIS: ABSENT
OD_BLEPHARITIS: ABSENT

## 2020-06-16 ENCOUNTER — AMBUL SURGICAL CARE (OUTPATIENT)
Dept: URBAN - NONMETROPOLITAN AREA SURGERY 1 | Facility: SURGERY | Age: 55
Setting detail: OPHTHALMOLOGY
End: 2020-06-16
Payer: COMMERCIAL

## 2020-06-16 DIAGNOSIS — H40.032: ICD-10-CM

## 2020-06-16 PROCEDURE — G8918 PT W/O PREOP ORDER IV AB PRO: HCPCS | Performed by: OPHTHALMOLOGY

## 2020-06-16 PROCEDURE — 66761 REVISION OF IRIS: CPT | Performed by: OPHTHALMOLOGY

## 2020-06-16 PROCEDURE — G8907 PT DOC NO EVENTS ON DISCHARG: HCPCS | Performed by: OPHTHALMOLOGY

## 2020-06-23 ENCOUNTER — AMBUL SURGICAL CARE (OUTPATIENT)
Dept: URBAN - NONMETROPOLITAN AREA SURGERY 1 | Facility: SURGERY | Age: 55
Setting detail: OPHTHALMOLOGY
End: 2020-06-23
Payer: COMMERCIAL

## 2020-06-23 DIAGNOSIS — H40.031: ICD-10-CM

## 2020-06-23 PROCEDURE — G8918 PT W/O PREOP ORDER IV AB PRO: HCPCS | Performed by: OPHTHALMOLOGY

## 2020-06-23 PROCEDURE — G8907 PT DOC NO EVENTS ON DISCHARG: HCPCS | Performed by: OPHTHALMOLOGY

## 2020-06-23 PROCEDURE — 66761 REVISION OF IRIS: CPT | Performed by: OPHTHALMOLOGY

## 2020-06-26 ENCOUNTER — DOCTOR'S OFFICE (OUTPATIENT)
Dept: URBAN - NONMETROPOLITAN AREA CLINIC 1 | Facility: CLINIC | Age: 55
Setting detail: OPHTHALMOLOGY
End: 2020-06-26
Payer: COMMERCIAL

## 2020-06-26 ENCOUNTER — RX ONLY (RX ONLY)
Age: 55
End: 2020-06-26

## 2020-06-26 DIAGNOSIS — H10.12: ICD-10-CM

## 2020-06-26 DIAGNOSIS — H04.123: ICD-10-CM

## 2020-06-26 PROBLEM — H40.032: Status: RESOLVED | Noted: 2020-06-02 | Resolved: 2020-06-26

## 2020-06-26 PROBLEM — H40.031: Status: RESOLVED | Noted: 2020-06-02 | Resolved: 2020-06-26

## 2020-06-26 PROCEDURE — 99212 OFFICE O/P EST SF 10 MIN: CPT | Performed by: OPHTHALMOLOGY

## 2020-06-26 ASSESSMENT — LID EXAM ASSESSMENTS
OS_BLEPHARITIS: ABSENT
OD_BLEPHARITIS: ABSENT

## 2020-06-26 ASSESSMENT — KERATOMETRY
OS_K1POWER_DIOPTERS: 44.62
OS_AXISANGLE_DEGREES: 99
OS_K2POWER_DIOPTERS: 45.37
OD_AXISANGLE_DEGREES: 94
OD_K1POWER_DIOPTERS: 44.0
OD_K2POWER_DIOPTERS: 45.62

## 2020-06-26 ASSESSMENT — AXIALLENGTH_DERIVED
OS_AL: 23.1956
OD_AL: 23.0737
OD_AL: 24.5613
OS_AL: 24.2283

## 2020-06-26 ASSESSMENT — LACRIMAL DUCT - ASSESSMENT
OD_LACRIMAL_DUCT: PLUG PRESENT
OS_LACRIMAL_DUCT: PLUG PRESENT

## 2020-06-26 ASSESSMENT — VISUAL ACUITY
OS_BCVA: 20/30+1
OD_BCVA: 20/25-2

## 2020-06-26 ASSESSMENT — SPHEQUIV_DERIVED
OS_SPHEQUIV: -0.375
OS_SPHEQUIV: -3
OD_SPHEQUIV: 0.125
OD_SPHEQUIV: -3.625

## 2020-06-26 ASSESSMENT — REFRACTION_AUTOREFRACTION
OS_SPHERE: +0.25
OD_SPHERE: +0.75
OD_SPHERE: -3.25
OS_CYLINDER: -1.25
OD_AXIS: 011
OS_AXIS: 017
OD_AXIS: 040
OD_CYLINDER: -0.75
OD_CYLINDER: -1.25
OS_CYLINDER: -0.50
OS_SPHERE: -2.75
OS_AXIS: 035

## 2020-06-26 ASSESSMENT — DRY EYES - PHYSICIAN NOTES: OS_GENERALCOMMENTS: SEVERE K SICCA

## 2020-06-30 ENCOUNTER — DOCTOR'S OFFICE (OUTPATIENT)
Dept: URBAN - NONMETROPOLITAN AREA CLINIC 1 | Facility: CLINIC | Age: 55
Setting detail: OPHTHALMOLOGY
End: 2020-06-30
Payer: COMMERCIAL

## 2020-06-30 DIAGNOSIS — H04.122: ICD-10-CM

## 2020-06-30 DIAGNOSIS — H10.12: ICD-10-CM

## 2020-06-30 DIAGNOSIS — H04.121: ICD-10-CM

## 2020-06-30 PROCEDURE — 99024 POSTOP FOLLOW-UP VISIT: CPT | Performed by: OPHTHALMOLOGY

## 2020-06-30 ASSESSMENT — DRY EYES - PHYSICIAN NOTES: OS_GENERALCOMMENTS: K SICCA

## 2020-06-30 ASSESSMENT — KERATOMETRY
OS_AXISANGLE_DEGREES: 99
OD_AXISANGLE_DEGREES: 94
OD_K1POWER_DIOPTERS: 44.0
OS_K1POWER_DIOPTERS: 44.62
OD_K2POWER_DIOPTERS: 45.62
OS_K2POWER_DIOPTERS: 45.37

## 2020-06-30 ASSESSMENT — REFRACTION_AUTOREFRACTION
OD_CYLINDER: -1.25
OS_SPHERE: +0.25
OD_SPHERE: -3.25
OS_AXIS: 017
OD_SPHERE: +0.75
OS_SPHERE: -2.75
OS_CYLINDER: -0.50
OD_CYLINDER: -0.75
OD_AXIS: 011
OD_AXIS: 040
OS_AXIS: 035
OS_CYLINDER: -1.25

## 2020-06-30 ASSESSMENT — AXIALLENGTH_DERIVED
OS_AL: 24.2283
OD_AL: 23.0737
OD_AL: 24.5613
OS_AL: 23.1956

## 2020-06-30 ASSESSMENT — LACRIMAL DUCT - ASSESSMENT: OD_LACRIMAL_DUCT: PLUG PRESENT

## 2020-06-30 ASSESSMENT — SPHEQUIV_DERIVED
OD_SPHEQUIV: -3.625
OS_SPHEQUIV: -0.375
OD_SPHEQUIV: 0.125
OS_SPHEQUIV: -3

## 2020-06-30 ASSESSMENT — CONFRONTATIONAL VISUAL FIELD TEST (CVF)
OD_FINDINGS: FULL
OS_FINDINGS: FULL

## 2020-06-30 ASSESSMENT — LID EXAM ASSESSMENTS
OD_BLEPHARITIS: ABSENT
OS_BLEPHARITIS: ABSENT

## 2020-06-30 ASSESSMENT — VISUAL ACUITY
OS_BCVA: 20/30+1
OD_BCVA: 20/30+2

## 2020-10-05 ENCOUNTER — HOSPITAL ENCOUNTER (EMERGENCY)
Facility: HOSPITAL | Age: 55
Discharge: HOME | End: 2020-10-05
Attending: EMERGENCY MEDICINE | Admitting: EMERGENCY MEDICINE
Payer: COMMERCIAL

## 2020-10-05 ENCOUNTER — APPOINTMENT (EMERGENCY)
Dept: CT IMAGING | Facility: HOSPITAL | Age: 55
End: 2020-10-05
Payer: COMMERCIAL

## 2020-10-05 VITALS
HEIGHT: 67 IN | OXYGEN SATURATION: 96 % | RESPIRATION RATE: 20 BRPM | WEIGHT: 242.51 LBS | BODY MASS INDEX: 38.06 KG/M2 | DIASTOLIC BLOOD PRESSURE: 72 MMHG | SYSTOLIC BLOOD PRESSURE: 134 MMHG | TEMPERATURE: 97.7 F | HEART RATE: 63 BPM

## 2020-10-05 DIAGNOSIS — M54.50 ACUTE RIGHT-SIDED LOW BACK PAIN WITHOUT SCIATICA: Primary | ICD-10-CM

## 2020-10-05 DIAGNOSIS — N39.0 UTI (URINARY TRACT INFECTION): ICD-10-CM

## 2020-10-05 LAB
ALBUMIN SERPL BCP-MCNC: 3.7 G/DL (ref 3.5–5)
ALP SERPL-CCNC: 67 U/L (ref 46–116)
ALT SERPL W P-5'-P-CCNC: 40 U/L (ref 12–78)
ANION GAP SERPL CALCULATED.3IONS-SCNC: 7 MMOL/L (ref 4–13)
AST SERPL W P-5'-P-CCNC: 26 U/L (ref 5–45)
BACTERIA UR QL AUTO: ABNORMAL /HPF
BASOPHILS # BLD AUTO: 0.03 THOUSANDS/ΜL (ref 0–0.1)
BASOPHILS NFR BLD AUTO: 1 % (ref 0–1)
BILIRUB SERPL-MCNC: 1.46 MG/DL (ref 0.2–1)
BILIRUB UR QL STRIP: NEGATIVE
BUN SERPL-MCNC: 14 MG/DL (ref 5–25)
CALCIUM SERPL-MCNC: 8.7 MG/DL (ref 8.3–10.1)
CHLORIDE SERPL-SCNC: 103 MMOL/L (ref 100–108)
CLARITY UR: CLEAR
CO2 SERPL-SCNC: 31 MMOL/L (ref 21–32)
COLOR UR: YELLOW
CREAT SERPL-MCNC: 0.89 MG/DL (ref 0.6–1.3)
EOSINOPHIL # BLD AUTO: 0.22 THOUSAND/ΜL (ref 0–0.61)
EOSINOPHIL NFR BLD AUTO: 5 % (ref 0–6)
ERYTHROCYTE [DISTWIDTH] IN BLOOD BY AUTOMATED COUNT: 12.6 % (ref 11.6–15.1)
GFR SERPL CREATININE-BSD FRML MDRD: 75 ML/MIN/1.73SQ M
GLUCOSE SERPL-MCNC: 113 MG/DL (ref 65–140)
GLUCOSE UR STRIP-MCNC: NEGATIVE MG/DL
HCT VFR BLD AUTO: 40.2 % (ref 34.8–46.1)
HGB BLD-MCNC: 13.7 G/DL (ref 11.5–15.4)
HGB UR QL STRIP.AUTO: NEGATIVE
IMM GRANULOCYTES # BLD AUTO: 0.01 THOUSAND/UL (ref 0–0.2)
IMM GRANULOCYTES NFR BLD AUTO: 0 % (ref 0–2)
KETONES UR STRIP-MCNC: NEGATIVE MG/DL
LACTATE SERPL-SCNC: 1.6 MMOL/L (ref 0.5–2)
LEUKOCYTE ESTERASE UR QL STRIP: ABNORMAL
LYMPHOCYTES # BLD AUTO: 1.07 THOUSANDS/ΜL (ref 0.6–4.47)
LYMPHOCYTES NFR BLD AUTO: 24 % (ref 14–44)
MCH RBC QN AUTO: 30.2 PG (ref 26.8–34.3)
MCHC RBC AUTO-ENTMCNC: 34.1 G/DL (ref 31.4–37.4)
MCV RBC AUTO: 89 FL (ref 82–98)
MONOCYTES # BLD AUTO: 0.41 THOUSAND/ΜL (ref 0.17–1.22)
MONOCYTES NFR BLD AUTO: 9 % (ref 4–12)
NEUTROPHILS # BLD AUTO: 2.7 THOUSANDS/ΜL (ref 1.85–7.62)
NEUTS SEG NFR BLD AUTO: 61 % (ref 43–75)
NITRITE UR QL STRIP: NEGATIVE
NON-SQ EPI CELLS URNS QL MICRO: ABNORMAL /HPF
NRBC BLD AUTO-RTO: 0 /100 WBCS
PH UR STRIP.AUTO: 8.5 [PH]
PLATELET # BLD AUTO: 229 THOUSANDS/UL (ref 149–390)
PMV BLD AUTO: 9.3 FL (ref 8.9–12.7)
POTASSIUM SERPL-SCNC: 3.6 MMOL/L (ref 3.5–5.3)
PROT SERPL-MCNC: 7.5 G/DL (ref 6.4–8.2)
PROT UR STRIP-MCNC: NEGATIVE MG/DL
RBC # BLD AUTO: 4.53 MILLION/UL (ref 3.81–5.12)
RBC #/AREA URNS AUTO: ABNORMAL /HPF
SODIUM SERPL-SCNC: 141 MMOL/L (ref 136–145)
SP GR UR STRIP.AUTO: 1.01 (ref 1–1.03)
UROBILINOGEN UR QL STRIP.AUTO: 0.2 E.U./DL
WBC # BLD AUTO: 4.44 THOUSAND/UL (ref 4.31–10.16)
WBC #/AREA URNS AUTO: ABNORMAL /HPF

## 2020-10-05 PROCEDURE — G1004 CDSM NDSC: HCPCS

## 2020-10-05 PROCEDURE — 81001 URINALYSIS AUTO W/SCOPE: CPT | Performed by: PHYSICIAN ASSISTANT

## 2020-10-05 PROCEDURE — 85025 COMPLETE CBC W/AUTO DIFF WBC: CPT | Performed by: PHYSICIAN ASSISTANT

## 2020-10-05 PROCEDURE — 99285 EMERGENCY DEPT VISIT HI MDM: CPT | Performed by: PHYSICIAN ASSISTANT

## 2020-10-05 PROCEDURE — 36415 COLL VENOUS BLD VENIPUNCTURE: CPT | Performed by: PHYSICIAN ASSISTANT

## 2020-10-05 PROCEDURE — 74176 CT ABD & PELVIS W/O CONTRAST: CPT

## 2020-10-05 PROCEDURE — 83605 ASSAY OF LACTIC ACID: CPT | Performed by: PHYSICIAN ASSISTANT

## 2020-10-05 PROCEDURE — 80053 COMPREHEN METABOLIC PANEL: CPT | Performed by: PHYSICIAN ASSISTANT

## 2020-10-05 PROCEDURE — 96375 TX/PRO/DX INJ NEW DRUG ADDON: CPT

## 2020-10-05 PROCEDURE — 99284 EMERGENCY DEPT VISIT MOD MDM: CPT

## 2020-10-05 PROCEDURE — 96361 HYDRATE IV INFUSION ADD-ON: CPT

## 2020-10-05 PROCEDURE — 96374 THER/PROPH/DIAG INJ IV PUSH: CPT

## 2020-10-05 RX ORDER — KETOROLAC TROMETHAMINE 30 MG/ML
30 INJECTION, SOLUTION INTRAMUSCULAR; INTRAVENOUS ONCE
Status: COMPLETED | OUTPATIENT
Start: 2020-10-05 | End: 2020-10-05

## 2020-10-05 RX ORDER — LIDOCAINE 50 MG/G
1 PATCH TOPICAL ONCE
Status: DISCONTINUED | OUTPATIENT
Start: 2020-10-05 | End: 2020-10-05 | Stop reason: HOSPADM

## 2020-10-05 RX ORDER — NAPROXEN 500 MG/1
500 TABLET ORAL 2 TIMES DAILY WITH MEALS
Qty: 14 TABLET | Refills: 0 | Status: SHIPPED | OUTPATIENT
Start: 2020-10-05 | End: 2020-10-05 | Stop reason: SDUPTHER

## 2020-10-05 RX ORDER — CYCLOBENZAPRINE HCL 10 MG
10 TABLET ORAL 3 TIMES DAILY PRN
Qty: 9 TABLET | Refills: 0 | Status: SHIPPED | OUTPATIENT
Start: 2020-10-05 | End: 2020-10-05 | Stop reason: SDUPTHER

## 2020-10-05 RX ORDER — CYCLOBENZAPRINE HCL 10 MG
10 TABLET ORAL 3 TIMES DAILY PRN
Qty: 9 TABLET | Refills: 0 | Status: SHIPPED | OUTPATIENT
Start: 2020-10-05 | End: 2020-10-08

## 2020-10-05 RX ORDER — MORPHINE SULFATE 4 MG/ML
4 INJECTION, SOLUTION INTRAMUSCULAR; INTRAVENOUS ONCE
Status: COMPLETED | OUTPATIENT
Start: 2020-10-05 | End: 2020-10-05

## 2020-10-05 RX ORDER — CEPHALEXIN 500 MG/1
500 CAPSULE ORAL EVERY 6 HOURS SCHEDULED
Qty: 28 CAPSULE | Refills: 0 | Status: SHIPPED | OUTPATIENT
Start: 2020-10-05 | End: 2020-10-12

## 2020-10-05 RX ORDER — NAPROXEN 500 MG/1
500 TABLET ORAL 2 TIMES DAILY PRN
Qty: 14 TABLET | Refills: 0 | Status: SHIPPED | OUTPATIENT
Start: 2020-10-05 | End: 2020-10-12

## 2020-10-05 RX ORDER — CEPHALEXIN 250 MG/1
500 CAPSULE ORAL ONCE
Status: COMPLETED | OUTPATIENT
Start: 2020-10-05 | End: 2020-10-05

## 2020-10-05 RX ORDER — CEPHALEXIN 500 MG/1
500 CAPSULE ORAL EVERY 6 HOURS SCHEDULED
Qty: 28 CAPSULE | Refills: 0 | Status: SHIPPED | OUTPATIENT
Start: 2020-10-05 | End: 2020-10-05 | Stop reason: SDUPTHER

## 2020-10-05 RX ADMIN — LIDOCAINE 1 PATCH: 50 PATCH TOPICAL at 11:21

## 2020-10-05 RX ADMIN — MORPHINE SULFATE 4 MG: 4 INJECTION INTRAVENOUS at 10:49

## 2020-10-05 RX ADMIN — CEPHALEXIN 500 MG: 250 CAPSULE ORAL at 11:46

## 2020-10-05 RX ADMIN — SODIUM CHLORIDE 1000 ML: 0.9 INJECTION, SOLUTION INTRAVENOUS at 10:49

## 2020-10-05 RX ADMIN — KETOROLAC TROMETHAMINE 30 MG: 30 INJECTION, SOLUTION INTRAMUSCULAR at 11:21

## 2020-10-12 ENCOUNTER — DOCTOR'S OFFICE (OUTPATIENT)
Dept: URBAN - NONMETROPOLITAN AREA CLINIC 1 | Facility: CLINIC | Age: 55
Setting detail: OPHTHALMOLOGY
End: 2020-10-12
Payer: COMMERCIAL

## 2020-10-12 DIAGNOSIS — H43.813: ICD-10-CM

## 2020-10-12 DIAGNOSIS — H04.122: ICD-10-CM

## 2020-10-12 DIAGNOSIS — H44.23: ICD-10-CM

## 2020-10-12 DIAGNOSIS — H40.013: ICD-10-CM

## 2020-10-12 DIAGNOSIS — H04.123: ICD-10-CM

## 2020-10-12 PROBLEM — H10.12 ALLERGIC CONJUNCTIVITIS; LEFT EYE: Status: RESOLVED | Noted: 2020-06-26 | Resolved: 2020-10-12

## 2020-10-12 PROCEDURE — 83861 MICROFLUID ANALY TEARS: CPT | Performed by: OPHTHALMOLOGY

## 2020-10-12 PROCEDURE — 92014 COMPRE OPH EXAM EST PT 1/>: CPT | Performed by: OPHTHALMOLOGY

## 2020-10-12 PROCEDURE — 92134 CPTRZ OPH DX IMG PST SGM RTA: CPT | Performed by: OPHTHALMOLOGY

## 2020-10-12 PROCEDURE — 92201 OPSCPY EXTND RTA DRAW UNI/BI: CPT | Performed by: OPHTHALMOLOGY

## 2020-10-12 ASSESSMENT — CONFRONTATIONAL VISUAL FIELD TEST (CVF)
OD_FINDINGS: FULL
OS_FINDINGS: FULL

## 2020-10-12 ASSESSMENT — LID EXAM ASSESSMENTS
OD_BLEPHARITIS: ABSENT
OS_BLEPHARITIS: ABSENT

## 2020-10-12 ASSESSMENT — VISUAL ACUITY
OD_BCVA: 20/30
OS_BCVA: 20/30

## 2020-10-12 ASSESSMENT — KERATOMETRY
OD_K2POWER_DIOPTERS: 45.62
OS_AXISANGLE_DEGREES: 99
OD_K1POWER_DIOPTERS: 44.0
OD_AXISANGLE_DEGREES: 94
OS_K2POWER_DIOPTERS: 45.37
OS_K1POWER_DIOPTERS: 44.62

## 2020-10-12 ASSESSMENT — REFRACTION_AUTOREFRACTION
OD_AXIS: 011
OD_CYLINDER: -0.75
OD_SPHERE: +0.75
OD_AXIS: 040
OS_SPHERE: +0.25
OS_CYLINDER: -0.50
OS_AXIS: 035
OD_CYLINDER: -1.25
OS_AXIS: 017
OS_SPHERE: -2.75
OS_CYLINDER: -1.25
OD_SPHERE: -3.25

## 2020-10-12 ASSESSMENT — SPHEQUIV_DERIVED
OD_SPHEQUIV: 0.125
OS_SPHEQUIV: -0.375
OS_SPHEQUIV: -3
OD_SPHEQUIV: -3.625

## 2020-10-12 ASSESSMENT — AXIALLENGTH_DERIVED
OS_AL: 23.1956
OD_AL: 24.5613
OD_AL: 23.0737
OS_AL: 24.2283

## 2020-10-12 ASSESSMENT — DRY EYES - PHYSICIAN NOTES: OS_GENERALCOMMENTS: K SICCA

## 2020-10-12 ASSESSMENT — LACRIMAL DUCT - ASSESSMENT: OD_LACRIMAL_DUCT: PLUG PRESENT

## 2020-10-19 ENCOUNTER — DOCTOR'S OFFICE (OUTPATIENT)
Dept: URBAN - NONMETROPOLITAN AREA CLINIC 1 | Facility: CLINIC | Age: 55
Setting detail: OPHTHALMOLOGY
End: 2020-10-19
Payer: COMMERCIAL

## 2020-10-19 VITALS — HEIGHT: 55 IN

## 2020-10-19 DIAGNOSIS — H04.123: ICD-10-CM

## 2020-10-19 DIAGNOSIS — H44.23: ICD-10-CM

## 2020-10-19 DIAGNOSIS — H43.813: ICD-10-CM

## 2020-10-19 DIAGNOSIS — H40.013: ICD-10-CM

## 2020-10-19 PROCEDURE — 92014 COMPRE OPH EXAM EST PT 1/>: CPT | Performed by: OPHTHALMOLOGY

## 2020-10-19 PROCEDURE — 92250 FUNDUS PHOTOGRAPHY W/I&R: CPT | Performed by: OPHTHALMOLOGY

## 2020-10-19 PROCEDURE — 92235 FLUORESCEIN ANGRPH MLTIFRAME: CPT | Performed by: OPHTHALMOLOGY

## 2020-10-19 ASSESSMENT — REFRACTION_AUTOREFRACTION
OD_AXIS: 040
OD_CYLINDER: -1.25
OD_SPHERE: -3.25
OS_AXIS: 017
OS_CYLINDER: -0.50
OS_SPHERE: -2.75
OD_CYLINDER: -0.75
OD_SPHERE: +0.75
OS_SPHERE: +0.25
OD_AXIS: 011
OS_AXIS: 035
OS_CYLINDER: -1.25

## 2020-10-19 ASSESSMENT — CONFRONTATIONAL VISUAL FIELD TEST (CVF)
OD_FINDINGS: FULL
OS_FINDINGS: FULL

## 2020-10-19 ASSESSMENT — KERATOMETRY
OS_K1POWER_DIOPTERS: 44.62
OD_K2POWER_DIOPTERS: 45.62
OD_AXISANGLE_DEGREES: 94
OS_K2POWER_DIOPTERS: 45.37
OS_AXISANGLE_DEGREES: 99
OD_K1POWER_DIOPTERS: 44.0

## 2020-10-19 ASSESSMENT — VISUAL ACUITY
OS_BCVA: 20/25-2
OD_BCVA: 20/30+2

## 2020-10-19 ASSESSMENT — LACRIMAL DUCT - ASSESSMENT: OD_LACRIMAL_DUCT: PLUG PRESENT

## 2020-10-19 ASSESSMENT — PACHYMETRY
OD_CT_CORRECTION: -4
OD_CT_UM: 608
OS_CT_UM: 603
OS_CT_CORRECTION: -4

## 2020-10-19 ASSESSMENT — AXIALLENGTH_DERIVED
OD_AL: 23.0737
OS_AL: 23.1956
OD_AL: 24.5613
OS_AL: 24.2283

## 2020-10-19 ASSESSMENT — SPHEQUIV_DERIVED
OS_SPHEQUIV: -0.375
OS_SPHEQUIV: -3
OD_SPHEQUIV: -3.625
OD_SPHEQUIV: 0.125

## 2020-10-19 ASSESSMENT — LID EXAM ASSESSMENTS
OS_BLEPHARITIS: ABSENT
OD_BLEPHARITIS: ABSENT

## 2020-10-19 ASSESSMENT — DRY EYES - PHYSICIAN NOTES: OS_GENERALCOMMENTS: K SICCA

## 2020-10-19 ASSESSMENT — TONOMETRY
OS_IOP_MMHG: 14
OD_IOP_MMHG: 15

## 2020-10-27 ENCOUNTER — DOCTOR'S OFFICE (OUTPATIENT)
Dept: URBAN - NONMETROPOLITAN AREA CLINIC 1 | Facility: CLINIC | Age: 55
Setting detail: OPHTHALMOLOGY
End: 2020-10-27
Payer: COMMERCIAL

## 2020-10-27 VITALS — HEIGHT: 55 IN

## 2020-10-27 DIAGNOSIS — H44.23: ICD-10-CM

## 2020-10-27 DIAGNOSIS — H04.123: ICD-10-CM

## 2020-10-27 DIAGNOSIS — H40.013: ICD-10-CM

## 2020-10-27 DIAGNOSIS — H25.13: ICD-10-CM

## 2020-10-27 DIAGNOSIS — H43.813: ICD-10-CM

## 2020-10-27 PROCEDURE — 92014 COMPRE OPH EXAM EST PT 1/>: CPT | Performed by: OPHTHALMOLOGY

## 2020-10-27 PROCEDURE — 76514 ECHO EXAM OF EYE THICKNESS: CPT | Performed by: OPHTHALMOLOGY

## 2020-10-27 PROCEDURE — 92133 CPTRZD OPH DX IMG PST SGM ON: CPT | Performed by: OPHTHALMOLOGY

## 2020-10-27 ASSESSMENT — VISUAL ACUITY
OS_BCVA: 20/30-1
OD_BCVA: 20/30-1

## 2020-10-27 ASSESSMENT — SPHEQUIV_DERIVED
OD_SPHEQUIV: -3.625
OD_SPHEQUIV: 0.5
OS_SPHEQUIV: -0.625
OS_SPHEQUIV: -3

## 2020-10-27 ASSESSMENT — REFRACTION_AUTOREFRACTION
OD_CYLINDER: -0.50
OD_SPHERE: -3.25
OD_AXIS: 013
OS_AXIS: 017
OS_SPHERE: -2.75
OS_CYLINDER: -0.75
OD_AXIS: 011
OD_CYLINDER: -0.75
OS_SPHERE: -0.25
OS_CYLINDER: -0.50
OS_AXIS: 032
OD_SPHERE: +0.75

## 2020-10-27 ASSESSMENT — LID EXAM ASSESSMENTS
OD_BLEPHARITIS: ABSENT
OS_BLEPHARITIS: ABSENT

## 2020-10-27 ASSESSMENT — KERATOMETRY
OD_K2POWER_DIOPTERS: 45.62
OS_AXISANGLE_DEGREES: 99
OD_AXISANGLE_DEGREES: 94
OS_K1POWER_DIOPTERS: 44.62
OD_K1POWER_DIOPTERS: 44.0
OS_K2POWER_DIOPTERS: 45.37

## 2020-10-27 ASSESSMENT — PACHYMETRY
OD_CT_UM: 503
OD_CT_CORRECTION: 3
OS_CT_CORRECTION: 3
OS_CT_UM: 502

## 2020-10-27 ASSESSMENT — AXIALLENGTH_DERIVED
OS_AL: 23.2902
OS_AL: 24.2283
OD_AL: 22.9348
OD_AL: 24.5613

## 2020-10-27 ASSESSMENT — CONFRONTATIONAL VISUAL FIELD TEST (CVF)
OD_FINDINGS: FULL
OS_FINDINGS: FULL

## 2020-10-27 ASSESSMENT — TONOMETRY
OD_IOP_MMHG: 15
OS_IOP_MMHG: 16

## 2020-10-27 ASSESSMENT — LACRIMAL DUCT - ASSESSMENT: OD_LACRIMAL_DUCT: PLUG PRESENT

## 2020-10-27 ASSESSMENT — DRY EYES - PHYSICIAN NOTES: OS_GENERALCOMMENTS: K SICCA

## 2020-12-07 ENCOUNTER — DOCTOR'S OFFICE (OUTPATIENT)
Dept: URBAN - NONMETROPOLITAN AREA CLINIC 1 | Facility: CLINIC | Age: 55
Setting detail: OPHTHALMOLOGY
End: 2020-12-07
Payer: COMMERCIAL

## 2020-12-07 DIAGNOSIS — H44.22: ICD-10-CM

## 2020-12-07 DIAGNOSIS — H44.21: ICD-10-CM

## 2020-12-07 DIAGNOSIS — H43.813: ICD-10-CM

## 2020-12-07 DIAGNOSIS — H40.013: ICD-10-CM

## 2020-12-07 PROCEDURE — 92134 CPTRZ OPH DX IMG PST SGM RTA: CPT | Performed by: OPHTHALMOLOGY

## 2020-12-07 PROCEDURE — 92014 COMPRE OPH EXAM EST PT 1/>: CPT | Performed by: OPHTHALMOLOGY

## 2020-12-07 PROCEDURE — 92201 OPSCPY EXTND RTA DRAW UNI/BI: CPT | Performed by: OPHTHALMOLOGY

## 2020-12-07 ASSESSMENT — KERATOMETRY
OS_K2POWER_DIOPTERS: 45.37
OS_AXISANGLE_DEGREES: 99
OD_AXISANGLE_DEGREES: 94
OS_K1POWER_DIOPTERS: 44.62
OD_K2POWER_DIOPTERS: 45.62
OD_K1POWER_DIOPTERS: 44.0

## 2020-12-07 ASSESSMENT — REFRACTION_AUTOREFRACTION
OD_CYLINDER: -0.75
OS_SPHERE: -2.75
OS_AXIS: 032
OD_SPHERE: -3.25
OS_SPHERE: -0.25
OS_CYLINDER: -0.50
OS_CYLINDER: -0.75
OD_AXIS: 013
OD_CYLINDER: -0.50
OD_AXIS: 011
OS_AXIS: 017
OD_SPHERE: +0.75

## 2020-12-07 ASSESSMENT — AXIALLENGTH_DERIVED
OS_AL: 23.2902
OD_AL: 24.5613
OS_AL: 24.2283
OD_AL: 22.9348

## 2020-12-07 ASSESSMENT — SPHEQUIV_DERIVED
OS_SPHEQUIV: -0.625
OS_SPHEQUIV: -3
OD_SPHEQUIV: -3.625
OD_SPHEQUIV: 0.5

## 2020-12-07 ASSESSMENT — CONFRONTATIONAL VISUAL FIELD TEST (CVF)
OD_FINDINGS: FULL
OS_FINDINGS: FULL

## 2020-12-07 ASSESSMENT — LID EXAM ASSESSMENTS
OD_BLEPHARITIS: ABSENT
OS_BLEPHARITIS: ABSENT

## 2020-12-07 ASSESSMENT — DRY EYES - PHYSICIAN NOTES: OS_GENERALCOMMENTS: K SICCA

## 2020-12-07 ASSESSMENT — VISUAL ACUITY
OS_BCVA: 20/40+2
OD_BCVA: 20/30+1

## 2020-12-07 ASSESSMENT — LACRIMAL DUCT - ASSESSMENT: OD_LACRIMAL_DUCT: PLUG PRESENT

## 2021-02-11 ENCOUNTER — DOCTOR'S OFFICE (OUTPATIENT)
Dept: URBAN - NONMETROPOLITAN AREA CLINIC 1 | Facility: CLINIC | Age: 56
Setting detail: OPHTHALMOLOGY
End: 2021-02-11
Payer: COMMERCIAL

## 2021-02-11 DIAGNOSIS — H44.23: ICD-10-CM

## 2021-02-11 DIAGNOSIS — H43.813: ICD-10-CM

## 2021-02-11 PROCEDURE — 92014 COMPRE OPH EXAM EST PT 1/>: CPT | Performed by: OPHTHALMOLOGY

## 2021-02-11 PROCEDURE — 92201 OPSCPY EXTND RTA DRAW UNI/BI: CPT | Performed by: OPHTHALMOLOGY

## 2021-02-11 PROCEDURE — 92134 CPTRZ OPH DX IMG PST SGM RTA: CPT | Performed by: OPHTHALMOLOGY

## 2021-02-11 ASSESSMENT — REFRACTION_AUTOREFRACTION
OS_AXIS: 032
OD_AXIS: 013
OD_AXIS: 011
OS_SPHERE: -0.25
OS_SPHERE: -2.75
OS_CYLINDER: -0.50
OD_CYLINDER: -0.75
OS_AXIS: 017
OD_SPHERE: +0.75
OD_CYLINDER: -0.50
OS_CYLINDER: -0.75
OD_SPHERE: -3.25

## 2021-02-11 ASSESSMENT — SPHEQUIV_DERIVED
OD_SPHEQUIV: -3.625
OD_SPHEQUIV: 0.5
OS_SPHEQUIV: -3
OS_SPHEQUIV: -0.625

## 2021-02-11 ASSESSMENT — LID EXAM ASSESSMENTS
OS_BLEPHARITIS: ABSENT
OD_BLEPHARITIS: ABSENT

## 2021-02-11 ASSESSMENT — KERATOMETRY
OD_AXISANGLE_DEGREES: 94
OS_K2POWER_DIOPTERS: 45.37
OD_K2POWER_DIOPTERS: 45.62
OD_K1POWER_DIOPTERS: 44.0
OS_K1POWER_DIOPTERS: 44.62
OS_AXISANGLE_DEGREES: 99

## 2021-02-11 ASSESSMENT — VISUAL ACUITY
OS_BCVA: 20/25
OD_BCVA: 20/25

## 2021-02-11 ASSESSMENT — CONFRONTATIONAL VISUAL FIELD TEST (CVF)
OS_FINDINGS: FULL
OD_FINDINGS: FULL

## 2021-02-11 ASSESSMENT — AXIALLENGTH_DERIVED
OD_AL: 24.5613
OS_AL: 24.2283
OS_AL: 23.2902
OD_AL: 22.9348

## 2021-02-11 ASSESSMENT — LACRIMAL DUCT - ASSESSMENT: OD_LACRIMAL_DUCT: PLUG PRESENT

## 2021-02-11 ASSESSMENT — DRY EYES - PHYSICIAN NOTES: OS_GENERALCOMMENTS: K SICCA

## 2021-04-27 ENCOUNTER — DOCTOR'S OFFICE (OUTPATIENT)
Dept: URBAN - NONMETROPOLITAN AREA CLINIC 1 | Facility: CLINIC | Age: 56
Setting detail: OPHTHALMOLOGY
End: 2021-04-27
Payer: COMMERCIAL

## 2021-04-27 DIAGNOSIS — H43.813: ICD-10-CM

## 2021-04-27 DIAGNOSIS — H04.123: ICD-10-CM

## 2021-04-27 DIAGNOSIS — H44.23: ICD-10-CM

## 2021-04-27 DIAGNOSIS — H40.013: ICD-10-CM

## 2021-04-27 PROCEDURE — 92083 EXTENDED VISUAL FIELD XM: CPT | Performed by: OPHTHALMOLOGY

## 2021-04-27 PROCEDURE — 92014 COMPRE OPH EXAM EST PT 1/>: CPT | Performed by: OPHTHALMOLOGY

## 2021-04-27 PROCEDURE — 92250 FUNDUS PHOTOGRAPHY W/I&R: CPT | Performed by: OPHTHALMOLOGY

## 2021-04-27 ASSESSMENT — LID EXAM ASSESSMENTS
OS_BLEPHARITIS: ABSENT
OD_BLEPHARITIS: ABSENT

## 2021-04-27 ASSESSMENT — PACHYMETRY
OS_CT_CORRECTION: -4
OS_CT_UM: 603
OD_CT_CORRECTION: -4
OD_CT_UM: 608

## 2021-04-27 ASSESSMENT — SPHEQUIV_DERIVED
OD_SPHEQUIV: -3.625
OS_SPHEQUIV: -0.625
OD_SPHEQUIV: 0.5
OS_SPHEQUIV: -3

## 2021-04-27 ASSESSMENT — AXIALLENGTH_DERIVED
OS_AL: 24.2283
OD_AL: 22.9348
OS_AL: 23.2902
OD_AL: 24.5613

## 2021-04-27 ASSESSMENT — REFRACTION_AUTOREFRACTION
OS_AXIS: 032
OD_SPHERE: +0.75
OD_SPHERE: -3.25
OS_CYLINDER: -0.50
OD_CYLINDER: -0.50
OS_SPHERE: -2.75
OS_AXIS: 017
OS_SPHERE: -0.25
OS_CYLINDER: -0.75
OD_AXIS: 011
OD_AXIS: 013
OD_CYLINDER: -0.75

## 2021-04-27 ASSESSMENT — VISUAL ACUITY
OD_BCVA: 20/25-1
OS_BCVA: 20/25-2

## 2021-04-27 ASSESSMENT — CONFRONTATIONAL VISUAL FIELD TEST (CVF)
OD_FINDINGS: FULL
OS_FINDINGS: FULL

## 2021-04-27 ASSESSMENT — TONOMETRY
OS_IOP_MMHG: 10
OD_IOP_MMHG: 10

## 2021-04-27 ASSESSMENT — KERATOMETRY
OS_AXISANGLE_DEGREES: 99
OD_K2POWER_DIOPTERS: 45.62
OS_K2POWER_DIOPTERS: 45.37
OD_AXISANGLE_DEGREES: 94
OS_K1POWER_DIOPTERS: 44.62
OD_K1POWER_DIOPTERS: 44.0

## 2021-04-27 ASSESSMENT — LACRIMAL DUCT - ASSESSMENT: OD_LACRIMAL_DUCT: PLUG PRESENT

## 2021-04-27 ASSESSMENT — DRY EYES - PHYSICIAN NOTES: OS_GENERALCOMMENTS: K SICCA

## 2021-07-30 ENCOUNTER — DOCTOR'S OFFICE (OUTPATIENT)
Dept: URBAN - NONMETROPOLITAN AREA CLINIC 1 | Facility: CLINIC | Age: 56
Setting detail: OPHTHALMOLOGY
End: 2021-07-30
Payer: COMMERCIAL

## 2021-07-30 DIAGNOSIS — H44.23: ICD-10-CM

## 2021-07-30 DIAGNOSIS — H04.123: ICD-10-CM

## 2021-07-30 DIAGNOSIS — H53.123: ICD-10-CM

## 2021-07-30 DIAGNOSIS — H43.813: ICD-10-CM

## 2021-07-30 PROCEDURE — 92014 COMPRE OPH EXAM EST PT 1/>: CPT | Performed by: OPHTHALMOLOGY

## 2021-07-30 PROCEDURE — 92134 CPTRZ OPH DX IMG PST SGM RTA: CPT | Performed by: OPHTHALMOLOGY

## 2021-07-30 PROCEDURE — 92201 OPSCPY EXTND RTA DRAW UNI/BI: CPT | Performed by: OPHTHALMOLOGY

## 2021-07-30 ASSESSMENT — AXIALLENGTH_DERIVED
OD_AL: 24.5613
OS_AL: 23.2902
OS_AL: 24.2283
OD_AL: 22.9348

## 2021-07-30 ASSESSMENT — REFRACTION_AUTOREFRACTION
OS_CYLINDER: -0.50
OD_CYLINDER: -0.75
OS_SPHERE: -2.75
OD_SPHERE: -3.25
OS_CYLINDER: -0.75
OD_AXIS: 011
OS_AXIS: 032
OS_AXIS: 017
OD_SPHERE: +0.75
OS_SPHERE: -0.25
OD_CYLINDER: -0.50
OD_AXIS: 013

## 2021-07-30 ASSESSMENT — VISUAL ACUITY
OS_BCVA: 20/30
OD_BCVA: 20/30+2

## 2021-07-30 ASSESSMENT — SPHEQUIV_DERIVED
OD_SPHEQUIV: 0.5
OS_SPHEQUIV: -3
OS_SPHEQUIV: -0.625
OD_SPHEQUIV: -3.625

## 2021-07-30 ASSESSMENT — CONFRONTATIONAL VISUAL FIELD TEST (CVF)
OS_FINDINGS: FULL
OD_FINDINGS: FULL

## 2021-07-30 ASSESSMENT — LID EXAM ASSESSMENTS
OD_BLEPHARITIS: ABSENT
OS_BLEPHARITIS: ABSENT

## 2021-07-30 ASSESSMENT — KERATOMETRY
OD_K1POWER_DIOPTERS: 44.0
OS_K2POWER_DIOPTERS: 45.37
OD_K2POWER_DIOPTERS: 45.62
OD_AXISANGLE_DEGREES: 94
OS_K1POWER_DIOPTERS: 44.62
OS_AXISANGLE_DEGREES: 99

## 2021-07-30 ASSESSMENT — LACRIMAL DUCT - ASSESSMENT: OD_LACRIMAL_DUCT: PLUG PRESENT

## 2021-07-30 ASSESSMENT — DRY EYES - PHYSICIAN NOTES: OS_GENERALCOMMENTS: K SICCA

## 2021-08-06 ENCOUNTER — DOCTOR'S OFFICE (OUTPATIENT)
Dept: URBAN - NONMETROPOLITAN AREA CLINIC 1 | Facility: CLINIC | Age: 56
Setting detail: OPHTHALMOLOGY
End: 2021-08-06
Payer: COMMERCIAL

## 2021-08-06 DIAGNOSIS — H44.23: ICD-10-CM

## 2021-08-06 DIAGNOSIS — H25.13: ICD-10-CM

## 2021-08-06 DIAGNOSIS — H53.123: ICD-10-CM

## 2021-08-06 DIAGNOSIS — H43.813: ICD-10-CM

## 2021-08-06 PROCEDURE — 92014 COMPRE OPH EXAM EST PT 1/>: CPT | Performed by: OPHTHALMOLOGY

## 2021-08-06 PROCEDURE — 92250 FUNDUS PHOTOGRAPHY W/I&R: CPT | Performed by: OPHTHALMOLOGY

## 2021-08-06 PROCEDURE — 92235 FLUORESCEIN ANGRPH MLTIFRAME: CPT | Performed by: OPHTHALMOLOGY

## 2021-08-06 ASSESSMENT — KERATOMETRY
OS_K1POWER_DIOPTERS: 44.62
OD_K2POWER_DIOPTERS: 45.62
OS_AXISANGLE_DEGREES: 99
OD_K1POWER_DIOPTERS: 44.0
OD_AXISANGLE_DEGREES: 94
OS_K2POWER_DIOPTERS: 45.37

## 2021-08-06 ASSESSMENT — REFRACTION_AUTOREFRACTION
OS_AXIS: 032
OD_CYLINDER: -0.50
OS_CYLINDER: -0.75
OS_AXIS: 017
OD_SPHERE: +0.75
OS_CYLINDER: -0.50
OD_CYLINDER: -0.75
OD_SPHERE: -3.25
OD_AXIS: 013
OS_SPHERE: -2.75
OD_AXIS: 011
OS_SPHERE: -0.25

## 2021-08-06 ASSESSMENT — SPHEQUIV_DERIVED
OS_SPHEQUIV: -3
OD_SPHEQUIV: 0.5
OD_SPHEQUIV: -3.625
OS_SPHEQUIV: -0.625

## 2021-08-06 ASSESSMENT — VISUAL ACUITY
OD_BCVA: 20/30+2
OS_BCVA: 20/30

## 2021-08-06 ASSESSMENT — CONFRONTATIONAL VISUAL FIELD TEST (CVF)
OS_FINDINGS: FULL
OD_FINDINGS: FULL

## 2021-08-06 ASSESSMENT — AXIALLENGTH_DERIVED
OS_AL: 23.2902
OD_AL: 22.9348
OS_AL: 24.2283
OD_AL: 24.5613

## 2021-08-06 ASSESSMENT — LID EXAM ASSESSMENTS
OS_BLEPHARITIS: ABSENT
OD_BLEPHARITIS: ABSENT

## 2021-08-06 ASSESSMENT — DRY EYES - PHYSICIAN NOTES: OS_GENERALCOMMENTS: K SICCA

## 2021-08-06 ASSESSMENT — LACRIMAL DUCT - ASSESSMENT: OD_LACRIMAL_DUCT: PLUG PRESENT

## 2021-11-16 ENCOUNTER — DOCTOR'S OFFICE (OUTPATIENT)
Dept: URBAN - NONMETROPOLITAN AREA CLINIC 1 | Facility: CLINIC | Age: 56
Setting detail: OPHTHALMOLOGY
End: 2021-11-16
Payer: COMMERCIAL

## 2021-11-16 DIAGNOSIS — H40.013: ICD-10-CM

## 2021-11-16 DIAGNOSIS — H04.123: ICD-10-CM

## 2021-11-16 DIAGNOSIS — H25.13: ICD-10-CM

## 2021-11-16 PROCEDURE — 92133 CPTRZD OPH DX IMG PST SGM ON: CPT | Performed by: OPHTHALMOLOGY

## 2021-11-16 PROCEDURE — 92012 INTRM OPH EXAM EST PATIENT: CPT | Performed by: OPHTHALMOLOGY

## 2021-11-16 ASSESSMENT — VISUAL ACUITY
OS_BCVA: 20/25
OD_BCVA: 20/25-2

## 2021-11-16 ASSESSMENT — CONFRONTATIONAL VISUAL FIELD TEST (CVF)
OD_FINDINGS: FULL
OS_FINDINGS: FULL

## 2021-11-16 ASSESSMENT — REFRACTION_AUTOREFRACTION
OS_SPHERE: -2.75
OS_SPHERE: 0.00
OD_SPHERE: -3.25
OS_CYLINDER: -0.50
OS_CYLINDER: -0.75
OS_AXIS: 017
OD_AXIS: 011
OS_AXIS: 033
OD_CYLINDER: -0.75
OD_CYLINDER: -1.00
OD_AXIS: 036
OD_SPHERE: +1.75

## 2021-11-16 ASSESSMENT — AXIALLENGTH_DERIVED
OS_AL: 24.2283
OD_AL: 24.5613
OD_AL: 22.6619
OS_AL: 23.1956

## 2021-11-16 ASSESSMENT — PACHYMETRY
OD_CT_CORRECTION: -4
OS_CT_UM: 603
OS_CT_CORRECTION: -4
OD_CT_UM: 608

## 2021-11-16 ASSESSMENT — SPHEQUIV_DERIVED
OD_SPHEQUIV: -3.625
OS_SPHEQUIV: -0.375
OD_SPHEQUIV: 1.25
OS_SPHEQUIV: -3

## 2021-11-16 ASSESSMENT — KERATOMETRY
OD_K2POWER_DIOPTERS: 45.62
OS_AXISANGLE_DEGREES: 99
OS_K2POWER_DIOPTERS: 45.37
OD_AXISANGLE_DEGREES: 94
OS_K1POWER_DIOPTERS: 44.62
OD_K1POWER_DIOPTERS: 44.0

## 2021-11-16 ASSESSMENT — TONOMETRY
OD_IOP_MMHG: 11
OS_IOP_MMHG: 12

## 2021-11-16 ASSESSMENT — LACRIMAL DUCT - ASSESSMENT: OD_LACRIMAL_DUCT: PLUG PRESENT

## 2021-11-16 ASSESSMENT — LID EXAM ASSESSMENTS
OD_BLEPHARITIS: ABSENT
OS_BLEPHARITIS: ABSENT

## 2021-11-16 ASSESSMENT — DRY EYES - PHYSICIAN NOTES: OS_GENERALCOMMENTS: K SICCA

## 2021-11-19 ENCOUNTER — DOCTOR'S OFFICE (OUTPATIENT)
Dept: URBAN - NONMETROPOLITAN AREA CLINIC 1 | Facility: CLINIC | Age: 56
Setting detail: OPHTHALMOLOGY
End: 2021-11-19
Payer: COMMERCIAL

## 2021-11-19 DIAGNOSIS — H44.23: ICD-10-CM

## 2021-11-19 DIAGNOSIS — H43.813: ICD-10-CM

## 2021-11-19 PROCEDURE — 92134 CPTRZ OPH DX IMG PST SGM RTA: CPT | Performed by: OPHTHALMOLOGY

## 2021-11-19 PROCEDURE — 99214 OFFICE O/P EST MOD 30 MIN: CPT | Performed by: OPHTHALMOLOGY

## 2021-11-19 PROCEDURE — 92201 OPSCPY EXTND RTA DRAW UNI/BI: CPT | Performed by: OPHTHALMOLOGY

## 2021-11-19 ASSESSMENT — KERATOMETRY
OS_AXISANGLE_DEGREES: 99
OS_K2POWER_DIOPTERS: 45.37
OD_AXISANGLE_DEGREES: 94
OD_K2POWER_DIOPTERS: 45.62
OD_K1POWER_DIOPTERS: 44.0
OS_K1POWER_DIOPTERS: 44.62

## 2021-11-19 ASSESSMENT — REFRACTION_AUTOREFRACTION
OS_SPHERE: 0.00
OD_SPHERE: +1.75
OS_AXIS: 017
OS_CYLINDER: -0.75
OS_AXIS: 033
OD_CYLINDER: -1.00
OS_CYLINDER: -0.50
OS_SPHERE: -2.75
OD_SPHERE: -3.25
OD_AXIS: 036
OD_AXIS: 011
OD_CYLINDER: -0.75

## 2021-11-19 ASSESSMENT — SPHEQUIV_DERIVED
OD_SPHEQUIV: -3.625
OS_SPHEQUIV: -0.375
OD_SPHEQUIV: 1.25
OS_SPHEQUIV: -3

## 2021-11-19 ASSESSMENT — CONFRONTATIONAL VISUAL FIELD TEST (CVF)
OD_FINDINGS: FULL
OS_FINDINGS: FULL

## 2021-11-19 ASSESSMENT — VISUAL ACUITY
OD_BCVA: 20/20-1
OS_BCVA: 20/30

## 2021-11-19 ASSESSMENT — AXIALLENGTH_DERIVED
OD_AL: 24.5613
OD_AL: 22.6619
OS_AL: 23.1956
OS_AL: 24.2283

## 2021-11-19 ASSESSMENT — LID EXAM ASSESSMENTS
OD_BLEPHARITIS: ABSENT
OS_BLEPHARITIS: ABSENT

## 2021-11-19 ASSESSMENT — DRY EYES - PHYSICIAN NOTES: OS_GENERALCOMMENTS: K SICCA

## 2021-11-19 ASSESSMENT — LACRIMAL DUCT - ASSESSMENT: OD_LACRIMAL_DUCT: PLUG PRESENT

## 2021-11-22 ENCOUNTER — DOCTOR'S OFFICE (OUTPATIENT)
Dept: URBAN - NONMETROPOLITAN AREA CLINIC 1 | Facility: CLINIC | Age: 56
Setting detail: OPHTHALMOLOGY
End: 2021-11-22
Payer: COMMERCIAL

## 2021-11-22 DIAGNOSIS — H43.813: ICD-10-CM

## 2021-11-22 DIAGNOSIS — H44.23: ICD-10-CM

## 2021-11-22 PROCEDURE — 99214 OFFICE O/P EST MOD 30 MIN: CPT | Performed by: OPHTHALMOLOGY

## 2021-11-22 PROCEDURE — 92250 FUNDUS PHOTOGRAPHY W/I&R: CPT | Performed by: OPHTHALMOLOGY

## 2021-11-22 PROCEDURE — 92235 FLUORESCEIN ANGRPH MLTIFRAME: CPT | Performed by: OPHTHALMOLOGY

## 2021-11-22 ASSESSMENT — KERATOMETRY
OD_K2POWER_DIOPTERS: 45.62
OD_K1POWER_DIOPTERS: 44.0
OD_AXISANGLE_DEGREES: 94
OS_K1POWER_DIOPTERS: 44.62
OS_AXISANGLE_DEGREES: 99
OS_K2POWER_DIOPTERS: 45.37

## 2021-11-22 ASSESSMENT — REFRACTION_AUTOREFRACTION
OS_CYLINDER: -0.50
OS_SPHERE: -2.75
OD_SPHERE: -3.25
OS_CYLINDER: -0.75
OS_AXIS: 017
OD_AXIS: 036
OD_CYLINDER: -1.00
OS_AXIS: 033
OD_SPHERE: +1.75
OD_CYLINDER: -0.75
OS_SPHERE: 0.00
OD_AXIS: 011

## 2021-11-22 ASSESSMENT — SPHEQUIV_DERIVED
OS_SPHEQUIV: -3
OS_SPHEQUIV: -0.375
OD_SPHEQUIV: -3.625
OD_SPHEQUIV: 1.25

## 2021-11-22 ASSESSMENT — LID EXAM ASSESSMENTS
OS_BLEPHARITIS: ABSENT
OD_BLEPHARITIS: ABSENT

## 2021-11-22 ASSESSMENT — CONFRONTATIONAL VISUAL FIELD TEST (CVF)
OD_FINDINGS: FULL
OS_FINDINGS: FULL

## 2021-11-22 ASSESSMENT — AXIALLENGTH_DERIVED
OS_AL: 23.1956
OD_AL: 24.5613
OS_AL: 24.2283
OD_AL: 22.6619

## 2021-11-22 ASSESSMENT — DRY EYES - PHYSICIAN NOTES: OS_GENERALCOMMENTS: K SICCA

## 2021-11-22 ASSESSMENT — VISUAL ACUITY
OD_BCVA: 20/20-2
OS_BCVA: 20/30+1

## 2021-11-22 ASSESSMENT — LACRIMAL DUCT - ASSESSMENT: OD_LACRIMAL_DUCT: PLUG PRESENT

## 2022-02-14 ENCOUNTER — DOCTOR'S OFFICE (OUTPATIENT)
Dept: URBAN - NONMETROPOLITAN AREA CLINIC 1 | Facility: CLINIC | Age: 57
Setting detail: OPHTHALMOLOGY
End: 2022-02-14
Payer: COMMERCIAL

## 2022-02-14 DIAGNOSIS — H44.23: ICD-10-CM

## 2022-02-14 DIAGNOSIS — H43.813: ICD-10-CM

## 2022-02-14 DIAGNOSIS — H25.13: ICD-10-CM

## 2022-02-14 PROCEDURE — 92134 CPTRZ OPH DX IMG PST SGM RTA: CPT | Performed by: OPHTHALMOLOGY

## 2022-02-14 PROCEDURE — 99213 OFFICE O/P EST LOW 20 MIN: CPT | Performed by: OPHTHALMOLOGY

## 2022-02-14 ASSESSMENT — CONFRONTATIONAL VISUAL FIELD TEST (CVF)
OS_FINDINGS: FULL
OD_FINDINGS: FULL

## 2022-02-14 ASSESSMENT — AXIALLENGTH_DERIVED
OS_AL: 24.2283
OD_AL: 24.5613
OD_AL: 22.6619
OS_AL: 23.1956

## 2022-02-14 ASSESSMENT — KERATOMETRY
OD_AXISANGLE_DEGREES: 94
OD_K1POWER_DIOPTERS: 44.0
OD_K2POWER_DIOPTERS: 45.62
OS_AXISANGLE_DEGREES: 99
OS_K2POWER_DIOPTERS: 45.37
OS_K1POWER_DIOPTERS: 44.62

## 2022-02-14 ASSESSMENT — REFRACTION_AUTOREFRACTION
OS_CYLINDER: -0.75
OD_AXIS: 036
OD_CYLINDER: -1.00
OS_SPHERE: -2.75
OS_AXIS: 033
OD_SPHERE: +1.75
OS_CYLINDER: -0.50
OD_CYLINDER: -0.75
OD_SPHERE: -3.25
OD_AXIS: 011
OS_AXIS: 017
OS_SPHERE: 0.00

## 2022-02-14 ASSESSMENT — DRY EYES - PHYSICIAN NOTES: OS_GENERALCOMMENTS: K SICCA

## 2022-02-14 ASSESSMENT — SPHEQUIV_DERIVED
OS_SPHEQUIV: -0.375
OS_SPHEQUIV: -3
OD_SPHEQUIV: 1.25
OD_SPHEQUIV: -3.625

## 2022-02-14 ASSESSMENT — LACRIMAL DUCT - ASSESSMENT: OD_LACRIMAL_DUCT: PLUG PRESENT

## 2022-02-14 ASSESSMENT — LID EXAM ASSESSMENTS
OS_BLEPHARITIS: ABSENT
OD_BLEPHARITIS: ABSENT

## 2022-02-14 ASSESSMENT — VISUAL ACUITY
OD_BCVA: 20/25+1
OS_BCVA: 20/30

## 2022-05-23 ENCOUNTER — DOCTOR'S OFFICE (OUTPATIENT)
Dept: URBAN - NONMETROPOLITAN AREA CLINIC 1 | Facility: CLINIC | Age: 57
Setting detail: OPHTHALMOLOGY
End: 2022-05-23
Payer: COMMERCIAL

## 2022-05-23 DIAGNOSIS — H44.23: ICD-10-CM

## 2022-05-23 DIAGNOSIS — H43.813: ICD-10-CM

## 2022-05-23 PROCEDURE — 99212 OFFICE O/P EST SF 10 MIN: CPT | Performed by: OPHTHALMOLOGY

## 2022-05-23 PROCEDURE — 92134 CPTRZ OPH DX IMG PST SGM RTA: CPT | Performed by: OPHTHALMOLOGY

## 2022-05-23 ASSESSMENT — AXIALLENGTH_DERIVED
OD_AL: 24.5613
OD_AL: 22.6619
OS_AL: 23.1956
OS_AL: 24.2283

## 2022-05-23 ASSESSMENT — KERATOMETRY
OS_AXISANGLE_DEGREES: 99
OS_K2POWER_DIOPTERS: 45.37
OD_AXISANGLE_DEGREES: 94
OS_K1POWER_DIOPTERS: 44.62
OD_K2POWER_DIOPTERS: 45.62
OD_K1POWER_DIOPTERS: 44.0

## 2022-05-23 ASSESSMENT — REFRACTION_AUTOREFRACTION
OD_AXIS: 036
OS_CYLINDER: -0.50
OD_SPHERE: +1.75
OS_AXIS: 017
OD_CYLINDER: -1.00
OS_AXIS: 033
OS_SPHERE: -2.75
OD_CYLINDER: -0.75
OD_SPHERE: -3.25
OD_AXIS: 011
OS_CYLINDER: -0.75
OS_SPHERE: 0.00

## 2022-05-23 ASSESSMENT — SPHEQUIV_DERIVED
OD_SPHEQUIV: 1.25
OD_SPHEQUIV: -3.625
OS_SPHEQUIV: -3
OS_SPHEQUIV: -0.375

## 2022-05-23 ASSESSMENT — DRY EYES - PHYSICIAN NOTES: OS_GENERALCOMMENTS: K SICCA

## 2022-05-23 ASSESSMENT — VISUAL ACUITY
OS_BCVA: 20/30+1
OD_BCVA: 20/25-1

## 2022-05-23 ASSESSMENT — LACRIMAL DUCT - ASSESSMENT: OD_LACRIMAL_DUCT: PLUG PRESENT

## 2022-05-23 ASSESSMENT — LID EXAM ASSESSMENTS
OD_BLEPHARITIS: ABSENT
OS_BLEPHARITIS: ABSENT

## 2022-05-23 ASSESSMENT — CONFRONTATIONAL VISUAL FIELD TEST (CVF)
OS_FINDINGS: FULL
OD_FINDINGS: FULL

## 2022-07-21 ENCOUNTER — DOCTOR'S OFFICE (OUTPATIENT)
Dept: URBAN - NONMETROPOLITAN AREA CLINIC 1 | Facility: CLINIC | Age: 57
Setting detail: OPHTHALMOLOGY
End: 2022-07-21
Payer: COMMERCIAL

## 2022-07-21 DIAGNOSIS — H43.813: ICD-10-CM

## 2022-07-21 DIAGNOSIS — H44.23: ICD-10-CM

## 2022-07-21 DIAGNOSIS — H04.123: ICD-10-CM

## 2022-07-21 PROCEDURE — 92134 CPTRZ OPH DX IMG PST SGM RTA: CPT | Performed by: OPHTHALMOLOGY

## 2022-07-21 PROCEDURE — 92201 OPSCPY EXTND RTA DRAW UNI/BI: CPT | Performed by: OPHTHALMOLOGY

## 2022-07-21 PROCEDURE — 99214 OFFICE O/P EST MOD 30 MIN: CPT | Performed by: OPHTHALMOLOGY

## 2022-07-21 ASSESSMENT — SPHEQUIV_DERIVED
OD_SPHEQUIV: 1.25
OS_SPHEQUIV: -0.375
OD_SPHEQUIV: -3.625
OS_SPHEQUIV: -3

## 2022-07-21 ASSESSMENT — VISUAL ACUITY
OS_BCVA: 20/50+2
OD_BCVA: 20/30+1

## 2022-07-21 ASSESSMENT — KERATOMETRY
OD_AXISANGLE_DEGREES: 94
OD_K2POWER_DIOPTERS: 45.62
OS_K2POWER_DIOPTERS: 45.37
OS_K1POWER_DIOPTERS: 44.62
OD_K1POWER_DIOPTERS: 44.0
OS_AXISANGLE_DEGREES: 99

## 2022-07-21 ASSESSMENT — REFRACTION_AUTOREFRACTION
OS_CYLINDER: -0.50
OD_AXIS: 036
OS_AXIS: 033
OS_CYLINDER: -0.75
OD_AXIS: 011
OS_SPHERE: 0.00
OS_AXIS: 017
OS_SPHERE: -2.75
OD_CYLINDER: -1.00
OD_SPHERE: +1.75
OD_CYLINDER: -0.75
OD_SPHERE: -3.25

## 2022-07-21 ASSESSMENT — AXIALLENGTH_DERIVED
OD_AL: 22.6619
OS_AL: 24.2283
OD_AL: 24.5613
OS_AL: 23.1956

## 2022-07-21 ASSESSMENT — CONFRONTATIONAL VISUAL FIELD TEST (CVF)
OD_FINDINGS: FULL
OS_FINDINGS: FULL

## 2022-07-21 ASSESSMENT — LACRIMAL DUCT - ASSESSMENT: OD_LACRIMAL_DUCT: PLUG PRESENT

## 2022-07-21 ASSESSMENT — LID EXAM ASSESSMENTS
OS_BLEPHARITIS: ABSENT
OD_BLEPHARITIS: ABSENT

## 2022-07-21 ASSESSMENT — DRY EYES - PHYSICIAN NOTES: OS_GENERALCOMMENTS: K SICCA

## 2022-08-04 ENCOUNTER — DOCTOR'S OFFICE (OUTPATIENT)
Dept: URBAN - NONMETROPOLITAN AREA CLINIC 1 | Facility: CLINIC | Age: 57
Setting detail: OPHTHALMOLOGY
End: 2022-08-04
Payer: COMMERCIAL

## 2022-08-04 DIAGNOSIS — H44.23: ICD-10-CM

## 2022-08-04 DIAGNOSIS — H04.123: ICD-10-CM

## 2022-08-04 DIAGNOSIS — H43.813: ICD-10-CM

## 2022-08-04 PROBLEM — H53.123: Status: ACTIVE | Noted: 2020-10-12

## 2022-08-04 PROBLEM — H44.22 MYOPIC DEGENERATION; RIGHT EYE, LEFT EYE: Status: ACTIVE | Noted: 2017-03-17

## 2022-08-04 PROBLEM — H25.12 CATARACT NUCLEAR SCLEROSIS AGE RELATED; RIGHT EYE, LEFT EYE: Status: ACTIVE | Noted: 2020-06-02

## 2022-08-04 PROBLEM — H44.21 MYOPIC DEGENERATION; RIGHT EYE, LEFT EYE: Status: ACTIVE | Noted: 2017-03-17

## 2022-08-04 PROBLEM — H52.13 LASIK; BOTH EYES: Status: ACTIVE | Noted: 2017-03-17

## 2022-08-04 PROBLEM — H04.122 DRY EYE OU; RIGHT EYE, LEFT EYE: Status: ACTIVE | Noted: 2017-03-17

## 2022-08-04 PROBLEM — H04.121 DRY EYE OU; RIGHT EYE, LEFT EYE: Status: ACTIVE | Noted: 2017-03-17

## 2022-08-04 PROBLEM — H25.11 CATARACT NUCLEAR SCLEROSIS AGE RELATED; RIGHT EYE, LEFT EYE: Status: ACTIVE | Noted: 2020-06-02

## 2022-08-04 PROCEDURE — 99213 OFFICE O/P EST LOW 20 MIN: CPT | Performed by: OPHTHALMOLOGY

## 2022-08-04 ASSESSMENT — KERATOMETRY
OS_AXISANGLE_DEGREES: 99
OD_K1POWER_DIOPTERS: 44.0
OD_K2POWER_DIOPTERS: 45.62
OS_K1POWER_DIOPTERS: 44.62
OD_AXISANGLE_DEGREES: 94
OS_K2POWER_DIOPTERS: 45.37

## 2022-08-04 ASSESSMENT — REFRACTION_AUTOREFRACTION
OD_AXIS: 036
OD_CYLINDER: -1.00
OS_CYLINDER: -0.50
OS_AXIS: 017
OS_SPHERE: -2.75
OS_CYLINDER: -0.75
OS_SPHERE: 0.00
OS_AXIS: 033
OD_SPHERE: -3.25
OD_CYLINDER: -0.75
OD_SPHERE: +1.75
OD_AXIS: 011

## 2022-08-04 ASSESSMENT — DRY EYES - PHYSICIAN NOTES: OS_GENERALCOMMENTS: K SICCA

## 2022-08-04 ASSESSMENT — AXIALLENGTH_DERIVED
OD_AL: 24.5613
OS_AL: 24.2283
OS_AL: 23.1956
OD_AL: 22.6619

## 2022-08-04 ASSESSMENT — SPHEQUIV_DERIVED
OS_SPHEQUIV: -3
OD_SPHEQUIV: -3.625
OS_SPHEQUIV: -0.375
OD_SPHEQUIV: 1.25

## 2022-08-04 ASSESSMENT — LID EXAM ASSESSMENTS
OD_BLEPHARITIS: ABSENT
OS_BLEPHARITIS: ABSENT

## 2022-08-04 ASSESSMENT — VISUAL ACUITY
OD_BCVA: 20/30+2
OS_BCVA: 20/25

## 2022-08-04 ASSESSMENT — LACRIMAL DUCT - ASSESSMENT: OD_LACRIMAL_DUCT: PLUG PRESENT

## 2022-10-20 ENCOUNTER — DOCTOR'S OFFICE (OUTPATIENT)
Dept: URBAN - NONMETROPOLITAN AREA CLINIC 1 | Facility: CLINIC | Age: 57
Setting detail: OPHTHALMOLOGY
End: 2022-10-20
Payer: COMMERCIAL

## 2022-10-20 DIAGNOSIS — H44.23: ICD-10-CM

## 2022-10-20 DIAGNOSIS — H40.013: ICD-10-CM

## 2022-10-20 DIAGNOSIS — H04.123: ICD-10-CM

## 2022-10-20 DIAGNOSIS — H43.813: ICD-10-CM

## 2022-10-20 PROCEDURE — 92201 OPSCPY EXTND RTA DRAW UNI/BI: CPT | Performed by: OPHTHALMOLOGY

## 2022-10-20 PROCEDURE — 92134 CPTRZ OPH DX IMG PST SGM RTA: CPT | Performed by: OPHTHALMOLOGY

## 2022-10-20 PROCEDURE — 99214 OFFICE O/P EST MOD 30 MIN: CPT | Performed by: OPHTHALMOLOGY

## 2022-10-20 ASSESSMENT — DRY EYES - PHYSICIAN NOTES: OS_GENERALCOMMENTS: K SICCA

## 2022-10-20 ASSESSMENT — AXIALLENGTH_DERIVED
OD_AL: 22.6619
OD_AL: 24.5613
OS_AL: 24.2283
OS_AL: 23.1956

## 2022-10-20 ASSESSMENT — REFRACTION_AUTOREFRACTION
OS_AXIS: 017
OD_CYLINDER: -0.75
OS_CYLINDER: -0.50
OS_AXIS: 033
OD_CYLINDER: -1.00
OD_AXIS: 011
OD_SPHERE: -3.25
OD_AXIS: 036
OD_SPHERE: +1.75
OS_CYLINDER: -0.75
OS_SPHERE: 0.00
OS_SPHERE: -2.75

## 2022-10-20 ASSESSMENT — KERATOMETRY
OS_K1POWER_DIOPTERS: 44.62
OS_K2POWER_DIOPTERS: 45.37
OD_K2POWER_DIOPTERS: 45.62
OS_AXISANGLE_DEGREES: 99
OD_K1POWER_DIOPTERS: 44.0
OD_AXISANGLE_DEGREES: 94

## 2022-10-20 ASSESSMENT — SPHEQUIV_DERIVED
OS_SPHEQUIV: -0.375
OS_SPHEQUIV: -3
OD_SPHEQUIV: 1.25
OD_SPHEQUIV: -3.625

## 2022-10-20 ASSESSMENT — CONFRONTATIONAL VISUAL FIELD TEST (CVF)
OS_FINDINGS: FULL
OD_FINDINGS: FULL

## 2022-10-20 ASSESSMENT — LID EXAM ASSESSMENTS
OS_BLEPHARITIS: ABSENT
OD_BLEPHARITIS: ABSENT

## 2022-10-20 ASSESSMENT — VISUAL ACUITY
OS_BCVA: 20/30+1
OD_BCVA: 20/25+1

## 2022-10-20 ASSESSMENT — LACRIMAL DUCT - ASSESSMENT: OD_LACRIMAL_DUCT: PLUG PRESENT

## 2022-11-15 ENCOUNTER — DOCTOR'S OFFICE (OUTPATIENT)
Dept: URBAN - NONMETROPOLITAN AREA CLINIC 1 | Facility: CLINIC | Age: 57
Setting detail: OPHTHALMOLOGY
End: 2022-11-15
Payer: COMMERCIAL

## 2022-11-15 DIAGNOSIS — H04.123: ICD-10-CM

## 2022-11-15 DIAGNOSIS — H25.13: ICD-10-CM

## 2022-11-15 DIAGNOSIS — H43.813: ICD-10-CM

## 2022-11-15 DIAGNOSIS — H44.23: ICD-10-CM

## 2022-11-15 DIAGNOSIS — H04.121: ICD-10-CM

## 2022-11-15 DIAGNOSIS — H04.122: ICD-10-CM

## 2022-11-15 PROCEDURE — 83861 MICROFLUID ANALY TEARS: CPT | Performed by: OPHTHALMOLOGY

## 2022-11-15 PROCEDURE — 99214 OFFICE O/P EST MOD 30 MIN: CPT | Performed by: OPHTHALMOLOGY

## 2022-11-15 ASSESSMENT — LID EXAM ASSESSMENTS
OS_BLEPHARITIS: ABSENT
OD_BLEPHARITIS: ABSENT

## 2022-11-15 ASSESSMENT — CONFRONTATIONAL VISUAL FIELD TEST (CVF)
OS_FINDINGS: FULL
OD_FINDINGS: FULL

## 2022-11-15 ASSESSMENT — TONOMETRY
OS_IOP_MMHG: 15
OD_IOP_MMHG: 15

## 2022-11-15 ASSESSMENT — DRY EYES - PHYSICIAN NOTES: OS_GENERALCOMMENTS: K SICCA

## 2022-11-15 ASSESSMENT — PACHYMETRY
OS_CT_UM: 603
OS_CT_CORRECTION: -4
OD_CT_UM: 608
OD_CT_CORRECTION: -4

## 2022-11-15 ASSESSMENT — LACRIMAL DUCT - ASSESSMENT: OD_LACRIMAL_DUCT: PLUG PRESENT

## 2022-11-18 PROBLEM — H25.13 CATARACT NUCLEAR SCLEROSIS; BOTH EYES: Status: ACTIVE | Noted: 2022-11-15

## 2022-11-18 ASSESSMENT — SPHEQUIV_DERIVED
OS_SPHEQUIV: -0.375
OD_SPHEQUIV: 1.25
OD_SPHEQUIV: -3.625
OS_SPHEQUIV: -3

## 2022-11-18 ASSESSMENT — AXIALLENGTH_DERIVED
OD_AL: 22.6619
OS_AL: 23.1956
OD_AL: 24.5613
OS_AL: 24.2283

## 2022-11-18 ASSESSMENT — KERATOMETRY
OD_AXISANGLE_DEGREES: 94
OS_K2POWER_DIOPTERS: 45.37
OD_K2POWER_DIOPTERS: 45.62
OS_AXISANGLE_DEGREES: 99
OS_K1POWER_DIOPTERS: 44.62
OD_K1POWER_DIOPTERS: 44.0

## 2022-11-18 ASSESSMENT — REFRACTION_AUTOREFRACTION
OS_SPHERE: -2.75
OS_CYLINDER: -0.50
OD_CYLINDER: -1.00
OD_AXIS: 036
OD_SPHERE: -3.25
OD_SPHERE: +1.75
OS_CYLINDER: -0.75
OD_AXIS: 011
OS_AXIS: 017
OS_SPHERE: 0.00
OD_CYLINDER: -0.75
OS_AXIS: 033

## 2022-11-18 ASSESSMENT — VISUAL ACUITY
OS_BCVA: 20/20-2
OD_BCVA: 20/30+2

## 2023-02-13 ENCOUNTER — DOCTOR'S OFFICE (OUTPATIENT)
Dept: URBAN - NONMETROPOLITAN AREA CLINIC 1 | Facility: CLINIC | Age: 58
Setting detail: OPHTHALMOLOGY
End: 2023-02-13
Payer: COMMERCIAL

## 2023-02-13 VITALS — HEIGHT: 55 IN

## 2023-02-13 DIAGNOSIS — H44.23: ICD-10-CM

## 2023-02-13 DIAGNOSIS — H43.813: ICD-10-CM

## 2023-02-13 PROBLEM — H25.11 CATARACT NUCLEAR SCLEROSIS AGE RELATED; RIGHT EYE, LEFT EYE: Status: ACTIVE | Noted: 2023-02-13

## 2023-02-13 PROBLEM — H25.12 CATARACT NUCLEAR SCLEROSIS AGE RELATED; RIGHT EYE, LEFT EYE: Status: ACTIVE | Noted: 2023-02-13

## 2023-02-13 PROCEDURE — 99213 OFFICE O/P EST LOW 20 MIN: CPT | Performed by: OPHTHALMOLOGY

## 2023-02-13 PROCEDURE — 92134 CPTRZ OPH DX IMG PST SGM RTA: CPT | Performed by: OPHTHALMOLOGY

## 2023-02-13 PROCEDURE — 92201 OPSCPY EXTND RTA DRAW UNI/BI: CPT | Performed by: OPHTHALMOLOGY

## 2023-02-13 ASSESSMENT — LID EXAM ASSESSMENTS
OD_BLEPHARITIS: ABSENT
OS_BLEPHARITIS: ABSENT

## 2023-02-13 ASSESSMENT — LACRIMAL DUCT - ASSESSMENT: OD_LACRIMAL_DUCT: PLUG PRESENT

## 2023-02-13 ASSESSMENT — KERATOMETRY
OD_AXISANGLE_DEGREES: 94
OD_K1POWER_DIOPTERS: 44.0
OS_K2POWER_DIOPTERS: 45.37
OS_K1POWER_DIOPTERS: 44.62
OD_K2POWER_DIOPTERS: 45.62
OS_AXISANGLE_DEGREES: 99

## 2023-02-13 ASSESSMENT — REFRACTION_AUTOREFRACTION
OS_SPHERE: -2.75
OS_CYLINDER: -0.75
OD_SPHERE: -3.25
OS_CYLINDER: -0.50
OD_AXIS: 036
OD_AXIS: 011
OS_AXIS: 033
OS_AXIS: 017
OD_CYLINDER: -0.75
OD_SPHERE: +1.75
OD_CYLINDER: -1.00
OS_SPHERE: 0.00

## 2023-02-13 ASSESSMENT — AXIALLENGTH_DERIVED
OD_AL: 24.5613
OS_AL: 24.2283
OS_AL: 23.1956
OD_AL: 22.6619

## 2023-02-13 ASSESSMENT — VISUAL ACUITY
OD_BCVA: 20/25
OS_BCVA: 20/30-2

## 2023-02-13 ASSESSMENT — SPHEQUIV_DERIVED
OD_SPHEQUIV: 1.25
OD_SPHEQUIV: -3.625
OS_SPHEQUIV: -0.375
OS_SPHEQUIV: -3

## 2023-06-27 ENCOUNTER — DOCTOR'S OFFICE (OUTPATIENT)
Dept: URBAN - NONMETROPOLITAN AREA CLINIC 1 | Facility: CLINIC | Age: 58
Setting detail: OPHTHALMOLOGY
End: 2023-06-27
Payer: COMMERCIAL

## 2023-06-27 DIAGNOSIS — H35.341: ICD-10-CM

## 2023-06-27 DIAGNOSIS — H04.121: ICD-10-CM

## 2023-06-27 DIAGNOSIS — H40.013: ICD-10-CM

## 2023-06-27 DIAGNOSIS — H43.813: ICD-10-CM

## 2023-06-27 DIAGNOSIS — H04.123: ICD-10-CM

## 2023-06-27 DIAGNOSIS — H25.13: ICD-10-CM

## 2023-06-27 DIAGNOSIS — H44.21: ICD-10-CM

## 2023-06-27 DIAGNOSIS — H04.122: ICD-10-CM

## 2023-06-27 DIAGNOSIS — H44.22: ICD-10-CM

## 2023-06-27 PROBLEM — H25.12 CATARACT NUCLEAR SCLEROSIS AGE RELATED; RIGHT EYE, LEFT EYE: Status: ACTIVE | Noted: 2023-06-27

## 2023-06-27 PROBLEM — H25.11 CATARACT NUCLEAR SCLEROSIS AGE RELATED; RIGHT EYE, LEFT EYE: Status: ACTIVE | Noted: 2023-06-27

## 2023-06-27 PROCEDURE — 83861 MICROFLUID ANALY TEARS: CPT | Performed by: OPHTHALMOLOGY

## 2023-06-27 PROCEDURE — 99214 OFFICE O/P EST MOD 30 MIN: CPT | Performed by: OPHTHALMOLOGY

## 2023-06-27 PROCEDURE — 92134 CPTRZ OPH DX IMG PST SGM RTA: CPT | Performed by: OPHTHALMOLOGY

## 2023-06-27 ASSESSMENT — KERATOMETRY
OS_K2POWER_DIOPTERS: 45.37
OD_K1POWER_DIOPTERS: 44.0
OS_K1POWER_DIOPTERS: 44.62
OD_AXISANGLE_DEGREES: 94
OD_K2POWER_DIOPTERS: 45.62
OS_AXISANGLE_DEGREES: 99

## 2023-06-27 ASSESSMENT — PACHYMETRY
OD_CT_CORRECTION: -4
OS_CT_UM: 603
OD_CT_UM: 608
OS_CT_CORRECTION: -4

## 2023-06-27 ASSESSMENT — REFRACTION_AUTOREFRACTION
OD_SPHERE: -3.25
OS_CYLINDER: -0.75
OD_AXIS: 036
OD_AXIS: 011
OD_SPHERE: +1.75
OS_CYLINDER: -0.50
OD_CYLINDER: -1.00
OD_CYLINDER: -0.75
OS_SPHERE: 0.00
OS_SPHERE: -2.75
OS_AXIS: 017
OS_AXIS: 033

## 2023-06-27 ASSESSMENT — AXIALLENGTH_DERIVED
OD_AL: 22.6619
OD_AL: 24.5613
OS_AL: 24.2283
OS_AL: 23.1956

## 2023-06-27 ASSESSMENT — SPHEQUIV_DERIVED
OS_SPHEQUIV: -3
OD_SPHEQUIV: 1.25
OD_SPHEQUIV: -3.625
OS_SPHEQUIV: -0.375

## 2023-06-27 ASSESSMENT — TONOMETRY
OD_IOP_MMHG: 16
OS_IOP_MMHG: 14

## 2023-06-27 ASSESSMENT — VISUAL ACUITY
OS_BCVA: 20/20
OD_BCVA: 20/20

## 2023-06-27 ASSESSMENT — CONFRONTATIONAL VISUAL FIELD TEST (CVF)
OD_FINDINGS: FULL
OS_FINDINGS: FULL

## 2023-08-07 ENCOUNTER — DOCTOR'S OFFICE (OUTPATIENT)
Dept: URBAN - NONMETROPOLITAN AREA CLINIC 1 | Facility: CLINIC | Age: 58
Setting detail: OPHTHALMOLOGY
End: 2023-08-07
Payer: COMMERCIAL

## 2023-08-07 DIAGNOSIS — H04.122: ICD-10-CM

## 2023-08-07 DIAGNOSIS — H35.341: ICD-10-CM

## 2023-08-07 DIAGNOSIS — H44.21: ICD-10-CM

## 2023-08-07 DIAGNOSIS — H04.123: ICD-10-CM

## 2023-08-07 DIAGNOSIS — H43.813: ICD-10-CM

## 2023-08-07 DIAGNOSIS — H40.013: ICD-10-CM

## 2023-08-07 PROBLEM — H25.13 CATARACT NUCLEAR SCLEROSIS; BOTH EYES: Status: ACTIVE | Noted: 2023-08-07

## 2023-08-07 PROCEDURE — 92202 OPSCPY EXTND ON/MAC DRAW: CPT | Performed by: OPHTHALMOLOGY

## 2023-08-07 PROCEDURE — 92134 CPTRZ OPH DX IMG PST SGM RTA: CPT | Performed by: OPHTHALMOLOGY

## 2023-08-07 PROCEDURE — 83861 MICROFLUID ANALY TEARS: CPT | Performed by: OPHTHALMOLOGY

## 2023-08-07 PROCEDURE — 99214 OFFICE O/P EST MOD 30 MIN: CPT | Performed by: OPHTHALMOLOGY

## 2023-08-07 ASSESSMENT — KERATOMETRY
OD_K2POWER_DIOPTERS: 45.62
OS_AXISANGLE_DEGREES: 99
OS_K1POWER_DIOPTERS: 44.62
OD_K1POWER_DIOPTERS: 44.0
OD_AXISANGLE_DEGREES: 94
OS_K2POWER_DIOPTERS: 45.37

## 2023-08-07 ASSESSMENT — SPHEQUIV_DERIVED
OD_SPHEQUIV: -3.625
OS_SPHEQUIV: -3
OS_SPHEQUIV: -0.375
OD_SPHEQUIV: 1.25

## 2023-08-07 ASSESSMENT — REFRACTION_AUTOREFRACTION
OD_SPHERE: +1.75
OS_AXIS: 017
OD_CYLINDER: -1.00
OD_AXIS: 036
OD_CYLINDER: -0.75
OS_CYLINDER: -0.75
OS_SPHERE: 0.00
OS_AXIS: 033
OS_CYLINDER: -0.50
OD_SPHERE: -3.25
OD_AXIS: 011
OS_SPHERE: -2.75

## 2023-08-07 ASSESSMENT — AXIALLENGTH_DERIVED
OS_AL: 23.1956
OD_AL: 24.5613
OS_AL: 24.2283
OD_AL: 22.6619

## 2023-08-07 ASSESSMENT — LACRIMAL DUCT - ASSESSMENT: OD_LACRIMAL_DUCT: PLUG PRESENT

## 2023-08-07 ASSESSMENT — VISUAL ACUITY
OD_BCVA: 20/25+1
OS_BCVA: 20/25-2

## 2023-08-07 ASSESSMENT — CONFRONTATIONAL VISUAL FIELD TEST (CVF)
OS_FINDINGS: FULL
OD_FINDINGS: FULL

## 2023-08-07 ASSESSMENT — LID EXAM ASSESSMENTS
OS_BLEPHARITIS: ABSENT
OD_BLEPHARITIS: ABSENT

## 2023-09-25 ENCOUNTER — DOCTOR'S OFFICE (OUTPATIENT)
Dept: URBAN - NONMETROPOLITAN AREA CLINIC 1 | Facility: CLINIC | Age: 58
Setting detail: OPHTHALMOLOGY
End: 2023-09-25
Payer: COMMERCIAL

## 2023-09-25 DIAGNOSIS — H04.123: ICD-10-CM

## 2023-09-25 DIAGNOSIS — H35.341: ICD-10-CM

## 2023-09-25 DIAGNOSIS — H43.813: ICD-10-CM

## 2023-09-25 DIAGNOSIS — H40.013: ICD-10-CM

## 2023-09-25 DIAGNOSIS — H44.23: ICD-10-CM

## 2023-09-25 DIAGNOSIS — H04.122: ICD-10-CM

## 2023-09-25 PROCEDURE — 99213 OFFICE O/P EST LOW 20 MIN: CPT | Performed by: OPHTHALMOLOGY

## 2023-09-25 PROCEDURE — 83861 MICROFLUID ANALY TEARS: CPT | Performed by: OPHTHALMOLOGY

## 2023-09-25 PROCEDURE — 92134 CPTRZ OPH DX IMG PST SGM RTA: CPT | Performed by: OPHTHALMOLOGY

## 2023-09-25 ASSESSMENT — VISUAL ACUITY
OD_BCVA: 20/25+2
OS_BCVA: 20/25+1

## 2023-09-25 ASSESSMENT — REFRACTION_AUTOREFRACTION
OD_SPHERE: +1.75
OD_SPHERE: -3.25
OD_AXIS: 036
OS_SPHERE: -2.75
OS_CYLINDER: -0.75
OD_CYLINDER: -0.75
OS_SPHERE: 0.00
OS_AXIS: 017
OD_AXIS: 011
OS_AXIS: 033
OD_CYLINDER: -1.00
OS_CYLINDER: -0.50

## 2023-09-25 ASSESSMENT — SPHEQUIV_DERIVED
OS_SPHEQUIV: -0.375
OD_SPHEQUIV: 1.25
OD_SPHEQUIV: -3.625
OS_SPHEQUIV: -3

## 2023-09-25 ASSESSMENT — CONFRONTATIONAL VISUAL FIELD TEST (CVF)
OD_FINDINGS: FULL
OS_FINDINGS: FULL

## 2023-09-25 ASSESSMENT — AXIALLENGTH_DERIVED
OD_AL: 22.6619
OS_AL: 23.1956
OD_AL: 24.5613
OS_AL: 24.2283

## 2023-09-25 ASSESSMENT — KERATOMETRY
OD_K1POWER_DIOPTERS: 44.0
OD_AXISANGLE_DEGREES: 94
OS_AXISANGLE_DEGREES: 99
OS_K2POWER_DIOPTERS: 45.37
OD_K2POWER_DIOPTERS: 45.62
OS_K1POWER_DIOPTERS: 44.62

## 2023-09-25 ASSESSMENT — LID EXAM ASSESSMENTS
OS_BLEPHARITIS: ABSENT
OD_BLEPHARITIS: ABSENT

## 2023-09-25 ASSESSMENT — LACRIMAL DUCT - ASSESSMENT: OD_LACRIMAL_DUCT: PLUG PRESENT

## 2023-12-15 ENCOUNTER — OFFICE VISIT (OUTPATIENT)
Dept: URGENT CARE | Facility: CLINIC | Age: 58
End: 2023-12-15
Payer: COMMERCIAL

## 2023-12-15 VITALS
SYSTOLIC BLOOD PRESSURE: 122 MMHG | OXYGEN SATURATION: 95 % | WEIGHT: 240 LBS | RESPIRATION RATE: 18 BRPM | HEART RATE: 77 BPM | TEMPERATURE: 99.8 F | DIASTOLIC BLOOD PRESSURE: 86 MMHG | BODY MASS INDEX: 37.67 KG/M2 | HEIGHT: 67 IN

## 2023-12-15 DIAGNOSIS — M94.0 COSTOCHONDRITIS: ICD-10-CM

## 2023-12-15 DIAGNOSIS — R05.1 ACUTE COUGH: Primary | ICD-10-CM

## 2023-12-15 PROCEDURE — 99213 OFFICE O/P EST LOW 20 MIN: CPT

## 2023-12-15 RX ORDER — MULTIVIT-MIN/IRON/FOLIC ACID/K 18-600-40
CAPSULE ORAL
COMMUNITY

## 2023-12-15 RX ORDER — CETIRIZINE HYDROCHLORIDE 10 MG/1
10 TABLET, CHEWABLE ORAL DAILY
COMMUNITY

## 2023-12-15 RX ORDER — LEVOTHYROXINE SODIUM 112 UG/1
112 TABLET ORAL DAILY
COMMUNITY

## 2023-12-15 RX ORDER — ALBUTEROL SULFATE 90 UG/1
2 AEROSOL, METERED RESPIRATORY (INHALATION) EVERY 6 HOURS PRN
Qty: 8.5 G | Refills: 0 | Status: SHIPPED | OUTPATIENT
Start: 2023-12-15

## 2023-12-15 RX ORDER — CARVEDILOL 25 MG/1
25 TABLET ORAL DAILY
COMMUNITY

## 2023-12-15 RX ORDER — ERGOCALCIFEROL 1.25 MG/1
50000 CAPSULE ORAL
COMMUNITY

## 2023-12-15 RX ORDER — HYDROCHLOROTHIAZIDE 25 MG/1
50 TABLET ORAL DAILY
COMMUNITY

## 2023-12-15 RX ORDER — BENZONATATE 200 MG/1
200 CAPSULE ORAL 3 TIMES DAILY PRN
Qty: 20 CAPSULE | Refills: 0 | Status: SHIPPED | OUTPATIENT
Start: 2023-12-15

## 2023-12-15 NOTE — PATIENT INSTRUCTIONS
Continue antibiotic as previously prescribed  Take Tessalon as needed for cough  Use albuterol inhaler as needed for shortness of breath   Plenty of fluids  Can use honey   Cool mist humidifier   Use Tylenol/ibuprofen as needed for fever or pain    Follow up with PCP in 3-5 days. Proceed to  ER if symptoms worsen.

## 2024-01-03 ENCOUNTER — DOCTOR'S OFFICE (OUTPATIENT)
Dept: URBAN - NONMETROPOLITAN AREA CLINIC 1 | Facility: CLINIC | Age: 59
Setting detail: OPHTHALMOLOGY
End: 2024-01-03
Payer: COMMERCIAL

## 2024-01-03 VITALS — HEIGHT: 55 IN

## 2024-01-03 DIAGNOSIS — H44.23: ICD-10-CM

## 2024-01-03 DIAGNOSIS — H43.813: ICD-10-CM

## 2024-01-03 DIAGNOSIS — H25.13: ICD-10-CM

## 2024-01-03 DIAGNOSIS — H35.341: ICD-10-CM

## 2024-01-03 DIAGNOSIS — H40.013: ICD-10-CM

## 2024-01-03 DIAGNOSIS — H04.123: ICD-10-CM

## 2024-01-03 PROCEDURE — 92133 CPTRZD OPH DX IMG PST SGM ON: CPT | Performed by: OPHTHALMOLOGY

## 2024-01-03 PROCEDURE — 99213 OFFICE O/P EST LOW 20 MIN: CPT | Performed by: OPHTHALMOLOGY

## 2024-01-03 ASSESSMENT — SPHEQUIV_DERIVED
OD_SPHEQUIV: -3.625
OS_SPHEQUIV: -0.375
OS_SPHEQUIV: -3
OD_SPHEQUIV: 0.625

## 2024-01-03 ASSESSMENT — REFRACTION_AUTOREFRACTION
OD_AXIS: 169
OS_AXIS: 017
OD_CYLINDER: -0.75
OS_SPHERE: -2.75
OS_AXIS: 026
OD_AXIS: 011
OS_CYLINDER: -0.75
OS_CYLINDER: -0.50
OS_SPHERE: 0.00
OD_SPHERE: +1.00
OD_CYLINDER: -0.75
OD_SPHERE: -3.25

## 2024-01-03 ASSESSMENT — LID EXAM ASSESSMENTS
OS_BLEPHARITIS: ABSENT
OD_BLEPHARITIS: ABSENT

## 2024-01-03 ASSESSMENT — LACRIMAL DUCT - ASSESSMENT: OD_LACRIMAL_DUCT: PLUG PRESENT

## 2024-01-15 ENCOUNTER — DOCTOR'S OFFICE (OUTPATIENT)
Dept: URBAN - NONMETROPOLITAN AREA CLINIC 1 | Facility: CLINIC | Age: 59
Setting detail: OPHTHALMOLOGY
End: 2024-01-15
Payer: COMMERCIAL

## 2024-01-15 DIAGNOSIS — H35.341: ICD-10-CM

## 2024-01-15 DIAGNOSIS — H44.23: ICD-10-CM

## 2024-01-15 DIAGNOSIS — H43.813: ICD-10-CM

## 2024-01-15 PROCEDURE — 92134 CPTRZ OPH DX IMG PST SGM RTA: CPT | Performed by: OPHTHALMOLOGY

## 2024-01-15 PROCEDURE — 99214 OFFICE O/P EST MOD 30 MIN: CPT | Performed by: OPHTHALMOLOGY

## 2024-01-15 ASSESSMENT — CONFRONTATIONAL VISUAL FIELD TEST (CVF)
OD_FINDINGS: FULL
OS_FINDINGS: FULL

## 2024-01-15 ASSESSMENT — REFRACTION_AUTOREFRACTION
OS_SPHERE: 0.00
OD_CYLINDER: -0.75
OD_AXIS: 011
OD_CYLINDER: -0.75
OD_SPHERE: +1.00
OS_CYLINDER: -0.75
OS_AXIS: 017
OS_CYLINDER: -0.50
OD_SPHERE: -3.25
OD_AXIS: 169
OS_AXIS: 026
OS_SPHERE: -2.75

## 2024-01-15 ASSESSMENT — LID EXAM ASSESSMENTS
OD_BLEPHARITIS: ABSENT
OS_BLEPHARITIS: ABSENT

## 2024-01-15 ASSESSMENT — SPHEQUIV_DERIVED
OD_SPHEQUIV: 0.625
OD_SPHEQUIV: -3.625
OS_SPHEQUIV: -3
OS_SPHEQUIV: -0.375

## 2024-01-15 ASSESSMENT — LACRIMAL DUCT - ASSESSMENT: OD_LACRIMAL_DUCT: PLUG PRESENT

## 2024-07-16 ENCOUNTER — DOCTOR'S OFFICE (OUTPATIENT)
Dept: URBAN - NONMETROPOLITAN AREA CLINIC 1 | Facility: CLINIC | Age: 59
Setting detail: OPHTHALMOLOGY
End: 2024-07-16
Payer: COMMERCIAL

## 2024-07-16 VITALS — HEIGHT: 55 IN

## 2024-07-16 DIAGNOSIS — H04.121: ICD-10-CM

## 2024-07-16 DIAGNOSIS — H25.13: ICD-10-CM

## 2024-07-16 DIAGNOSIS — H35.341: ICD-10-CM

## 2024-07-16 DIAGNOSIS — H40.013: ICD-10-CM

## 2024-07-16 DIAGNOSIS — H04.122: ICD-10-CM

## 2024-07-16 PROCEDURE — 92134 CPTRZ OPH DX IMG PST SGM RTA: CPT | Performed by: OPHTHALMOLOGY

## 2024-07-16 PROCEDURE — 92250 FUNDUS PHOTOGRAPHY W/I&R: CPT | Performed by: OPHTHALMOLOGY

## 2024-07-16 PROCEDURE — 99213 OFFICE O/P EST LOW 20 MIN: CPT | Performed by: OPHTHALMOLOGY

## 2024-07-16 PROCEDURE — 76514 ECHO EXAM OF EYE THICKNESS: CPT | Performed by: OPHTHALMOLOGY

## 2024-07-16 ASSESSMENT — LID EXAM ASSESSMENTS
OD_BLEPHARITIS: ABSENT
OS_BLEPHARITIS: ABSENT

## 2024-07-16 ASSESSMENT — CONFRONTATIONAL VISUAL FIELD TEST (CVF)
OS_FINDINGS: FULL
OD_FINDINGS: FULL

## 2024-10-03 ENCOUNTER — DOCTOR'S OFFICE (OUTPATIENT)
Dept: URBAN - NONMETROPOLITAN AREA CLINIC 1 | Facility: CLINIC | Age: 59
Setting detail: OPHTHALMOLOGY
End: 2024-10-03
Payer: COMMERCIAL

## 2024-10-03 DIAGNOSIS — H35.341: ICD-10-CM

## 2024-10-03 DIAGNOSIS — H44.23: ICD-10-CM

## 2024-10-03 DIAGNOSIS — H43.393: ICD-10-CM

## 2024-10-03 DIAGNOSIS — H43.813: ICD-10-CM

## 2024-10-03 PROBLEM — H35.40 PERIPAPILLARY ATROPHY: Status: ACTIVE | Noted: 2024-10-03

## 2024-10-03 PROCEDURE — 92201 OPSCPY EXTND RTA DRAW UNI/BI: CPT | Performed by: OPHTHALMOLOGY

## 2024-10-03 PROCEDURE — 92134 CPTRZ OPH DX IMG PST SGM RTA: CPT | Performed by: OPHTHALMOLOGY

## 2024-10-03 PROCEDURE — 99214 OFFICE O/P EST MOD 30 MIN: CPT | Performed by: OPHTHALMOLOGY

## 2024-10-03 ASSESSMENT — REFRACTION_AUTOREFRACTION
OD_SPHERE: +1.50
OD_SPHERE: -3.25
OS_AXIS: 017
OD_AXIS: 011
OS_CYLINDER: -0.50
OS_SPHERE: -2.75
OD_CYLINDER: -0.75
OS_AXIS: 026
OS_SPHERE: +1.00
OD_CYLINDER: -0.50
OS_CYLINDER: -1.00
OD_AXIS: 005

## 2024-10-03 ASSESSMENT — KERATOMETRY
OS_AXISANGLE_DEGREES: 99
OS_K1POWER_DIOPTERS: 44.62
OS_K2POWER_DIOPTERS: 45.37
OD_K1POWER_DIOPTERS: 44.0
OD_K2POWER_DIOPTERS: 45.62
OD_AXISANGLE_DEGREES: 94

## 2024-10-03 ASSESSMENT — VISUAL ACUITY
OD_BCVA: 20/25+2
OS_BCVA: 20/25

## 2024-10-03 ASSESSMENT — CONFRONTATIONAL VISUAL FIELD TEST (CVF)
OS_FINDINGS: FULL
OD_FINDINGS: FULL

## 2024-10-03 ASSESSMENT — LID EXAM ASSESSMENTS
OD_BLEPHARITIS: ABSENT
OS_BLEPHARITIS: ABSENT

## 2024-10-03 ASSESSMENT — LACRIMAL DUCT - ASSESSMENT: OD_LACRIMAL_DUCT: PLUG PRESENT

## 2025-01-22 ENCOUNTER — DOCTOR'S OFFICE (OUTPATIENT)
Dept: URBAN - NONMETROPOLITAN AREA CLINIC 1 | Facility: CLINIC | Age: 60
Setting detail: OPHTHALMOLOGY
End: 2025-01-22
Payer: COMMERCIAL

## 2025-01-22 DIAGNOSIS — H44.23: ICD-10-CM

## 2025-01-22 DIAGNOSIS — H43.813: ICD-10-CM

## 2025-01-22 DIAGNOSIS — H43.393: ICD-10-CM

## 2025-01-22 DIAGNOSIS — H40.013: ICD-10-CM

## 2025-01-22 DIAGNOSIS — H35.341: ICD-10-CM

## 2025-01-22 DIAGNOSIS — H04.123: ICD-10-CM

## 2025-01-22 PROCEDURE — 92083 EXTENDED VISUAL FIELD XM: CPT | Performed by: OPHTHALMOLOGY

## 2025-01-22 PROCEDURE — 99213 OFFICE O/P EST LOW 20 MIN: CPT | Performed by: OPHTHALMOLOGY

## 2025-01-22 ASSESSMENT — LACRIMAL DUCT - ASSESSMENT: OD_LACRIMAL_DUCT: PLUG PRESENT

## 2025-01-22 ASSESSMENT — REFRACTION_AUTOREFRACTION
OD_AXIS: 024
OS_SPHERE: -2.75
OD_CYLINDER: -0.50
OS_CYLINDER: -1.00
OD_SPHERE: +1.25
OD_CYLINDER: -0.75
OS_AXIS: 023
OS_CYLINDER: -0.50
OS_SPHERE: +0.75
OD_SPHERE: -3.25
OS_AXIS: 017
OD_AXIS: 011

## 2025-01-22 ASSESSMENT — CONFRONTATIONAL VISUAL FIELD TEST (CVF)
OD_FINDINGS: FULL
OS_FINDINGS: FULL

## 2025-01-22 ASSESSMENT — KERATOMETRY
OD_K1POWER_DIOPTERS: 44.0
OD_K2POWER_DIOPTERS: 45.62
OD_AXISANGLE_DEGREES: 94
OS_K1POWER_DIOPTERS: 44.62
OS_AXISANGLE_DEGREES: 99
OS_K2POWER_DIOPTERS: 45.37

## 2025-01-22 ASSESSMENT — LID EXAM ASSESSMENTS
OD_BLEPHARITIS: ABSENT
OS_BLEPHARITIS: ABSENT

## 2025-01-22 ASSESSMENT — PACHYMETRY
OS_CT_UM: 603
OD_CT_UM: 608
OS_CT_CORRECTION: -4
OD_CT_CORRECTION: -4

## 2025-01-22 ASSESSMENT — TONOMETRY
OS_IOP_MMHG: 18
OD_IOP_MMHG: 16

## 2025-01-22 ASSESSMENT — VISUAL ACUITY
OD_BCVA: 20/30+2
OS_BCVA: 20/25+2

## 2025-04-03 ENCOUNTER — DOCTOR'S OFFICE (OUTPATIENT)
Dept: URBAN - NONMETROPOLITAN AREA CLINIC 1 | Facility: CLINIC | Age: 60
Setting detail: OPHTHALMOLOGY
End: 2025-04-03
Payer: COMMERCIAL

## 2025-04-03 DIAGNOSIS — H43.393: ICD-10-CM

## 2025-04-03 DIAGNOSIS — H35.341: ICD-10-CM

## 2025-04-03 DIAGNOSIS — H43.813: ICD-10-CM

## 2025-04-03 DIAGNOSIS — H44.23: ICD-10-CM

## 2025-04-03 PROCEDURE — 92134 CPTRZ OPH DX IMG PST SGM RTA: CPT | Performed by: OPHTHALMOLOGY

## 2025-04-03 PROCEDURE — 92202 OPSCPY EXTND ON/MAC DRAW: CPT | Performed by: OPHTHALMOLOGY

## 2025-04-03 PROCEDURE — 99214 OFFICE O/P EST MOD 30 MIN: CPT | Performed by: OPHTHALMOLOGY

## 2025-04-03 ASSESSMENT — REFRACTION_AUTOREFRACTION
OS_AXIS: 023
OS_SPHERE: -2.75
OS_AXIS: 017
OD_CYLINDER: -0.75
OD_AXIS: 011
OD_CYLINDER: -0.50
OD_SPHERE: -3.25
OD_SPHERE: +1.25
OS_CYLINDER: -0.50
OS_CYLINDER: -1.00
OD_AXIS: 024
OS_SPHERE: +0.75

## 2025-04-03 ASSESSMENT — CONFRONTATIONAL VISUAL FIELD TEST (CVF)
OD_FINDINGS: FULL
OS_FINDINGS: FULL

## 2025-04-03 ASSESSMENT — KERATOMETRY
OD_K1POWER_DIOPTERS: 44.0
OD_K2POWER_DIOPTERS: 45.62
OS_AXISANGLE_DEGREES: 99
OS_K1POWER_DIOPTERS: 44.62
OS_K2POWER_DIOPTERS: 45.37
OD_AXISANGLE_DEGREES: 94

## 2025-04-03 ASSESSMENT — LID EXAM ASSESSMENTS
OS_BLEPHARITIS: ABSENT
OD_BLEPHARITIS: ABSENT

## 2025-04-03 ASSESSMENT — VISUAL ACUITY
OS_BCVA: 20/30+1
OD_BCVA: 20/25+1

## 2025-04-03 ASSESSMENT — LACRIMAL DUCT - ASSESSMENT: OD_LACRIMAL_DUCT: PLUG PRESENT

## 2025-07-23 ENCOUNTER — DOCTOR'S OFFICE (OUTPATIENT)
Dept: URBAN - NONMETROPOLITAN AREA CLINIC 1 | Facility: CLINIC | Age: 60
Setting detail: OPHTHALMOLOGY
End: 2025-07-23
Payer: COMMERCIAL

## 2025-07-23 DIAGNOSIS — H25.13: ICD-10-CM

## 2025-07-23 DIAGNOSIS — H04.123: ICD-10-CM

## 2025-07-23 DIAGNOSIS — H40.013: ICD-10-CM

## 2025-07-23 PROCEDURE — 76514 ECHO EXAM OF EYE THICKNESS: CPT | Performed by: OPHTHALMOLOGY

## 2025-07-23 PROCEDURE — 92133 CPTRZD OPH DX IMG PST SGM ON: CPT | Performed by: OPHTHALMOLOGY

## 2025-07-23 PROCEDURE — 99213 OFFICE O/P EST LOW 20 MIN: CPT | Performed by: OPHTHALMOLOGY

## 2025-07-23 ASSESSMENT — KERATOMETRY
OD_AXISANGLE_DEGREES: 94
OS_K1POWER_DIOPTERS: 44.62
OS_K2POWER_DIOPTERS: 45.37
OD_K1POWER_DIOPTERS: 44.0
OS_AXISANGLE_DEGREES: 99
OD_K2POWER_DIOPTERS: 45.62

## 2025-07-23 ASSESSMENT — REFRACTION_AUTOREFRACTION
OS_AXIS: 023
OD_SPHERE: +1.25
OD_AXIS: 011
OD_CYLINDER: -0.75
OS_AXIS: 017
OS_SPHERE: -2.75
OS_CYLINDER: -1.00
OS_CYLINDER: -0.50
OD_CYLINDER: -0.50
OS_SPHERE: +0.75
OD_AXIS: 024
OD_SPHERE: -3.25

## 2025-07-23 ASSESSMENT — PACHYMETRY
OD_CT_CORRECTION: 2
OS_CT_CORRECTION: 1
OD_CT_UM: 512
OS_CT_UM: 524

## 2025-07-23 ASSESSMENT — LACRIMAL DUCT - ASSESSMENT: OD_LACRIMAL_DUCT: PLUG PRESENT

## 2025-07-23 ASSESSMENT — CONFRONTATIONAL VISUAL FIELD TEST (CVF)
OS_FINDINGS: FULL
OD_FINDINGS: FULL

## 2025-07-23 ASSESSMENT — TONOMETRY
OS_IOP_MMHG: 10
OD_IOP_MMHG: 10

## 2025-07-23 ASSESSMENT — VISUAL ACUITY
OS_BCVA: 20/30
OD_BCVA: 20/30+2

## 2025-07-23 ASSESSMENT — LID EXAM ASSESSMENTS
OD_BLEPHARITIS: ABSENT
OS_BLEPHARITIS: ABSENT